# Patient Record
Sex: FEMALE | Employment: OTHER | URBAN - METROPOLITAN AREA
[De-identification: names, ages, dates, MRNs, and addresses within clinical notes are randomized per-mention and may not be internally consistent; named-entity substitution may affect disease eponyms.]

---

## 2020-01-01 ENCOUNTER — HOSPITAL ENCOUNTER (EMERGENCY)
Age: 85
Discharge: NURSING, REHAB OR LONG-TERM CARE | End: 2020-11-26
Payer: MEDICARE

## 2020-01-01 ENCOUNTER — HOSPITAL ENCOUNTER (INPATIENT)
Age: 85
LOS: 7 days | DRG: 177 | End: 2020-12-07
Attending: EMERGENCY MEDICINE | Admitting: FAMILY MEDICINE
Payer: MEDICARE

## 2020-01-01 ENCOUNTER — IP HISTORICAL/CONVERTED ENCOUNTER (OUTPATIENT)
Dept: OTHER | Age: 85
End: 2020-01-01

## 2020-01-01 ENCOUNTER — HOSPITAL ENCOUNTER (INPATIENT)
Age: 85
LOS: 4 days | Discharge: NURSING, REHAB OR LONG-TERM CARE | DRG: 689 | End: 2020-09-13
Attending: INTERNAL MEDICINE | Admitting: INTERNAL MEDICINE
Payer: MEDICARE

## 2020-01-01 ENCOUNTER — APPOINTMENT (OUTPATIENT)
Dept: GENERAL RADIOLOGY | Age: 85
DRG: 177 | End: 2020-01-01
Attending: EMERGENCY MEDICINE
Payer: MEDICARE

## 2020-01-01 ENCOUNTER — APPOINTMENT (OUTPATIENT)
Dept: GENERAL RADIOLOGY | Age: 85
DRG: 177 | End: 2020-01-01
Attending: FAMILY MEDICINE
Payer: MEDICARE

## 2020-01-01 ENCOUNTER — APPOINTMENT (OUTPATIENT)
Dept: GENERAL RADIOLOGY | Age: 85
End: 2020-01-01
Attending: PHYSICIAN ASSISTANT
Payer: MEDICARE

## 2020-01-01 ENCOUNTER — HOSPITAL ENCOUNTER (OUTPATIENT)
Dept: LAB | Age: 85
Discharge: HOME | End: 2020-10-30

## 2020-01-01 VITALS
HEIGHT: 63 IN | TEMPERATURE: 99.2 F | SYSTOLIC BLOOD PRESSURE: 114 MMHG | WEIGHT: 76 LBS | HEART RATE: 91 BPM | RESPIRATION RATE: 18 BRPM | DIASTOLIC BLOOD PRESSURE: 78 MMHG | OXYGEN SATURATION: 99 % | BODY MASS INDEX: 13.46 KG/M2

## 2020-01-01 VITALS
TEMPERATURE: 96.6 F | HEIGHT: 61 IN | HEART RATE: 69 BPM | BODY MASS INDEX: 18.88 KG/M2 | DIASTOLIC BLOOD PRESSURE: 80 MMHG | WEIGHT: 100 LBS | RESPIRATION RATE: 18 BRPM | SYSTOLIC BLOOD PRESSURE: 132 MMHG | OXYGEN SATURATION: 96 %

## 2020-01-01 VITALS
SYSTOLIC BLOOD PRESSURE: 131 MMHG | OXYGEN SATURATION: 98 % | WEIGHT: 77.16 LBS | BODY MASS INDEX: 14.57 KG/M2 | DIASTOLIC BLOOD PRESSURE: 61 MMHG | HEIGHT: 61 IN | HEART RATE: 77 BPM | RESPIRATION RATE: 18 BRPM | TEMPERATURE: 98.7 F

## 2020-01-01 DIAGNOSIS — J18.9 PNEUMONIA OF RIGHT UPPER LOBE DUE TO INFECTIOUS ORGANISM: Primary | ICD-10-CM

## 2020-01-01 DIAGNOSIS — U07.1 COVID-19: Primary | ICD-10-CM

## 2020-01-01 DIAGNOSIS — R05.9 COUGH: ICD-10-CM

## 2020-01-01 DIAGNOSIS — E87.0 HYPERNATREMIA: ICD-10-CM

## 2020-01-01 DIAGNOSIS — Z20.822 SUSPECTED COVID-19 VIRUS INFECTION: ICD-10-CM

## 2020-01-01 DIAGNOSIS — J96.01 ACUTE RESPIRATORY FAILURE WITH HYPOXIA (HCC): ICD-10-CM

## 2020-01-01 LAB
ABO + RH BLD: NORMAL
ALBUMIN SERPL-MCNC: 2 G/DL (ref 3.5–5)
ALBUMIN SERPL-MCNC: 2.3 G/DL (ref 3.5–5)
ALBUMIN SERPL-MCNC: 2.3 G/DL (ref 3.5–5)
ALBUMIN SERPL-MCNC: 2.4 G/DL (ref 3.5–5)
ALBUMIN SERPL-MCNC: 2.6 G/DL (ref 3.5–5)
ALBUMIN SERPL-MCNC: 2.7 G/DL (ref 3.5–5)
ALBUMIN SERPL-MCNC: 3 G/DL (ref 3.5–5)
ALBUMIN/GLOB SERPL: 0.5 {RATIO} (ref 1.1–2.2)
ALBUMIN/GLOB SERPL: 0.6 {RATIO} (ref 1.1–2.2)
ALBUMIN/GLOB SERPL: 0.7 {RATIO} (ref 1.1–2.2)
ALP SERPL-CCNC: 114 U/L (ref 45–117)
ALP SERPL-CCNC: 158 U/L (ref 45–117)
ALP SERPL-CCNC: 292 U/L (ref 45–117)
ALP SERPL-CCNC: 314 U/L (ref 45–117)
ALP SERPL-CCNC: 79 U/L (ref 45–117)
ALP SERPL-CCNC: 83 U/L (ref 45–117)
ALP SERPL-CCNC: 98 U/L (ref 45–117)
ALT SERPL-CCNC: 1363 U/L (ref 12–78)
ALT SERPL-CCNC: 14 U/L (ref 12–78)
ALT SERPL-CCNC: 14 U/L (ref 12–78)
ALT SERPL-CCNC: 15 U/L (ref 12–78)
ALT SERPL-CCNC: 451 U/L (ref 12–78)
ALT SERPL-CCNC: 46 U/L (ref 12–78)
ALT SERPL-CCNC: 704 U/L (ref 12–78)
ANION GAP SERPL CALC-SCNC: 3 MMOL/L (ref 5–15)
ANION GAP SERPL CALC-SCNC: 3 MMOL/L (ref 5–15)
ANION GAP SERPL CALC-SCNC: 5 MMOL/L (ref 5–15)
ANION GAP SERPL CALC-SCNC: 6 MMOL/L (ref 5–15)
ANION GAP SERPL CALC-SCNC: 6 MMOL/L (ref 5–15)
ANION GAP SERPL CALC-SCNC: 7 MMOL/L (ref 5–15)
ANION GAP SERPL CALC-SCNC: 8 MMOL/L (ref 5–15)
ANION GAP SERPL CALC-SCNC: 9 MMOL/L (ref 5–15)
APPEARANCE UR: ABNORMAL
AST SERPL W P-5'-P-CCNC: 23 U/L (ref 15–37)
AST SERPL W P-5'-P-CCNC: 26 U/L (ref 15–37)
AST SERPL W P-5'-P-CCNC: 28 U/L (ref 15–37)
AST SERPL W P-5'-P-CCNC: 411 U/L (ref 15–37)
AST SERPL W P-5'-P-CCNC: 63 U/L (ref 15–37)
AST SERPL W P-5'-P-CCNC: 770 U/L (ref 15–37)
AST SERPL W P-5'-P-CCNC: >2000 U/L (ref 15–37)
ATRIAL RATE: 116 BPM
ATRIAL RATE: 91 BPM
BACTERIA SPEC CULT: ABNORMAL
BACTERIA SPEC CULT: NORMAL
BACTERIA SPEC CULT: NORMAL
BACTERIA URNS QL MICRO: NEGATIVE /HPF
BASOPHILS # BLD: 0 K/UL (ref 0–0.1)
BASOPHILS NFR BLD: 0 % (ref 0–1)
BILIRUB SERPL-MCNC: 0.2 MG/DL (ref 0.2–1)
BILIRUB SERPL-MCNC: 0.3 MG/DL (ref 0.2–1)
BILIRUB SERPL-MCNC: 0.3 MG/DL (ref 0.2–1)
BILIRUB SERPL-MCNC: 0.4 MG/DL (ref 0.2–1)
BILIRUB SERPL-MCNC: 0.7 MG/DL (ref 0.2–1)
BILIRUB SERPL-MCNC: 2.9 MG/DL (ref 0.2–1)
BILIRUB SERPL-MCNC: 5.4 MG/DL (ref 0.2–1)
BILIRUB UR QL: NEGATIVE
BLD PROD TYP BPU: NORMAL
BLOOD GROUP ANTIBODIES SERPL: NEGATIVE
BNP SERPL-MCNC: 734 PG/ML
BPU ID: NORMAL
BUN SERPL-MCNC: 15 MG/DL (ref 6–20)
BUN SERPL-MCNC: 19 MG/DL (ref 6–20)
BUN SERPL-MCNC: 29 MG/DL (ref 6–20)
BUN SERPL-MCNC: 31 MG/DL (ref 6–20)
BUN SERPL-MCNC: 33 MG/DL (ref 6–20)
BUN SERPL-MCNC: 34 MG/DL (ref 6–20)
BUN SERPL-MCNC: 41 MG/DL (ref 6–20)
BUN SERPL-MCNC: 50 MG/DL (ref 6–20)
BUN/CREAT SERPL: 25 (ref 12–20)
BUN/CREAT SERPL: 30 (ref 12–20)
BUN/CREAT SERPL: 38 (ref 12–20)
BUN/CREAT SERPL: 40 (ref 12–20)
BUN/CREAT SERPL: 44 (ref 12–20)
BUN/CREAT SERPL: 45 (ref 12–20)
BUN/CREAT SERPL: 46 (ref 12–20)
BUN/CREAT SERPL: 46 (ref 12–20)
CA-I BLD-MCNC: 7.5 MG/DL (ref 8.5–10.1)
CA-I BLD-MCNC: 7.8 MG/DL (ref 8.5–10.1)
CA-I BLD-MCNC: 8.1 MG/DL (ref 8.5–10.1)
CA-I BLD-MCNC: 8.4 MG/DL (ref 8.5–10.1)
CA-I BLD-MCNC: 8.7 MG/DL (ref 8.5–10.1)
CA-I BLD-MCNC: 8.9 MG/DL (ref 8.5–10.1)
CA-I BLD-MCNC: 9.2 MG/DL (ref 8.5–10.1)
CA-I BLD-MCNC: 9.7 MG/DL (ref 8.5–10.1)
CALCULATED P AXIS, ECG09: 56 DEGREES
CALCULATED P AXIS, ECG09: 72 DEGREES
CALCULATED R AXIS, ECG10: 10 DEGREES
CALCULATED R AXIS, ECG10: 10 DEGREES
CALCULATED T AXIS, ECG11: 72 DEGREES
CALCULATED T AXIS, ECG11: 91 DEGREES
CC UR VC: ABNORMAL
CHLORIDE SERPL-SCNC: 100 MMOL/L (ref 97–108)
CHLORIDE SERPL-SCNC: 106 MMOL/L (ref 97–108)
CHLORIDE SERPL-SCNC: 110 MMOL/L (ref 97–108)
CHLORIDE SERPL-SCNC: 115 MMOL/L (ref 97–108)
CHLORIDE SERPL-SCNC: 122 MMOL/L (ref 97–108)
CHLORIDE SERPL-SCNC: 125 MMOL/L (ref 97–108)
CHLORIDE SERPL-SCNC: 131 MMOL/L (ref 97–108)
CHLORIDE SERPL-SCNC: 96 MMOL/L (ref 97–108)
CO2 SERPL-SCNC: 25 MMOL/L (ref 21–32)
CO2 SERPL-SCNC: 28 MMOL/L (ref 21–32)
CO2 SERPL-SCNC: 29 MMOL/L (ref 21–32)
CO2 SERPL-SCNC: 30 MMOL/L (ref 21–32)
CO2 SERPL-SCNC: 30 MMOL/L (ref 21–32)
CO2 SERPL-SCNC: 33 MMOL/L (ref 21–32)
COLOR UR: ABNORMAL
CREAT SERPL-MCNC: 0.6 MG/DL (ref 0.55–1.02)
CREAT SERPL-MCNC: 0.64 MG/DL (ref 0.55–1.02)
CREAT SERPL-MCNC: 0.66 MG/DL (ref 0.55–1.02)
CREAT SERPL-MCNC: 0.67 MG/DL (ref 0.55–1.02)
CREAT SERPL-MCNC: 0.74 MG/DL (ref 0.55–1.02)
CREAT SERPL-MCNC: 0.84 MG/DL (ref 0.55–1.02)
CREAT SERPL-MCNC: 0.89 MG/DL (ref 0.55–1.02)
CREAT SERPL-MCNC: 1.3 MG/DL (ref 0.55–1.02)
CRP SERPL-MCNC: 0.86 MG/DL (ref 0–0.6)
CRP SERPL-MCNC: 0.98 MG/DL (ref 0–0.6)
CRP SERPL-MCNC: 1.68 MG/DL (ref 0–0.6)
CRP SERPL-MCNC: 1.76 MG/DL (ref 0–0.6)
CRP SERPL-MCNC: 3.68 MG/DL (ref 0–0.6)
DIAGNOSIS, 93000: NORMAL
DIAGNOSIS, 93000: NORMAL
DIFFERENTIAL METHOD BLD: ABNORMAL
EOSINOPHIL # BLD: 0 K/UL (ref 0–0.4)
EOSINOPHIL NFR BLD: 0 % (ref 0–7)
ERYTHROCYTE [DISTWIDTH] IN BLOOD BY AUTOMATED COUNT: 13.8 % (ref 11.5–14.5)
ERYTHROCYTE [DISTWIDTH] IN BLOOD BY AUTOMATED COUNT: 14 % (ref 11.5–14.5)
ERYTHROCYTE [DISTWIDTH] IN BLOOD BY AUTOMATED COUNT: 14 % (ref 11.5–14.5)
ERYTHROCYTE [DISTWIDTH] IN BLOOD BY AUTOMATED COUNT: 14.3 % (ref 11.5–14.5)
ERYTHROCYTE [DISTWIDTH] IN BLOOD BY AUTOMATED COUNT: 14.3 % (ref 11.5–14.5)
ERYTHROCYTE [DISTWIDTH] IN BLOOD BY AUTOMATED COUNT: 14.6 % (ref 11.5–14.5)
ERYTHROCYTE [DISTWIDTH] IN BLOOD BY AUTOMATED COUNT: 14.7 % (ref 11.5–14.5)
ERYTHROCYTE [DISTWIDTH] IN BLOOD BY AUTOMATED COUNT: 14.8 % (ref 11.5–14.5)
FERRITIN SERPL-MCNC: 2619 NG/ML (ref 26–388)
FERRITIN SERPL-MCNC: 405 NG/ML (ref 8–252)
FERRITIN SERPL-MCNC: 429 NG/ML (ref 8–252)
FERRITIN SERPL-MCNC: 8574 NG/ML (ref 8–252)
FLUAV AG NPH QL IA: NEGATIVE
FLUBV AG NOSE QL IA: NEGATIVE
GLOBULIN SER CALC-MCNC: 3.2 G/DL (ref 2–4)
GLOBULIN SER CALC-MCNC: 3.6 G/DL (ref 2–4)
GLOBULIN SER CALC-MCNC: 3.9 G/DL (ref 2–4)
GLOBULIN SER CALC-MCNC: 4 G/DL (ref 2–4)
GLOBULIN SER CALC-MCNC: 4.3 G/DL (ref 2–4)
GLOBULIN SER CALC-MCNC: 5.4 G/DL (ref 2–4)
GLOBULIN SER CALC-MCNC: 5.7 G/DL (ref 2–4)
GLUCOSE SERPL-MCNC: 104 MG/DL (ref 65–100)
GLUCOSE SERPL-MCNC: 107 MG/DL (ref 65–100)
GLUCOSE SERPL-MCNC: 110 MG/DL (ref 65–100)
GLUCOSE SERPL-MCNC: 114 MG/DL (ref 65–100)
GLUCOSE SERPL-MCNC: 139 MG/DL (ref 65–100)
GLUCOSE SERPL-MCNC: 170 MG/DL (ref 65–100)
GLUCOSE SERPL-MCNC: 179 MG/DL (ref 65–100)
GLUCOSE SERPL-MCNC: 186 MG/DL (ref 65–100)
GLUCOSE UR STRIP.AUTO-MCNC: NEGATIVE MG/DL
HCT VFR BLD AUTO: 36.2 % (ref 35–47)
HCT VFR BLD AUTO: 37.8 % (ref 35–47)
HCT VFR BLD AUTO: 41.8 % (ref 35–47)
HCT VFR BLD AUTO: 42.1 % (ref 35–47)
HCT VFR BLD AUTO: 43.9 % (ref 35–47)
HCT VFR BLD AUTO: 44 % (ref 35–47)
HCT VFR BLD AUTO: 44.1 % (ref 35–47)
HCT VFR BLD AUTO: 45.7 % (ref 35–47)
HGB BLD-MCNC: 11.4 G/DL (ref 11.5–16)
HGB BLD-MCNC: 12.7 G/DL (ref 11.5–16)
HGB BLD-MCNC: 13 G/DL (ref 11.5–16)
HGB BLD-MCNC: 13.5 G/DL (ref 11.5–16)
HGB BLD-MCNC: 14 G/DL (ref 11.5–16)
HGB BLD-MCNC: 14 G/DL (ref 11.5–16)
HGB BLD-MCNC: 14.5 G/DL (ref 11.5–16)
HGB BLD-MCNC: 15 G/DL (ref 11.5–16)
HGB UR QL STRIP: NEGATIVE
HYALINE CASTS URNS QL MICRO: ABNORMAL /LPF (ref 0–5)
IMM GRANULOCYTES # BLD AUTO: 0 K/UL
IMM GRANULOCYTES # BLD AUTO: 0 K/UL (ref 0–0.04)
IMM GRANULOCYTES # BLD AUTO: 0.1 K/UL (ref 0–0.04)
IMM GRANULOCYTES # BLD AUTO: 0.2 K/UL (ref 0–0.04)
IMM GRANULOCYTES # BLD AUTO: 0.3 K/UL (ref 0–0.04)
IMM GRANULOCYTES # BLD AUTO: 0.5 K/UL (ref 0–0.04)
IMM GRANULOCYTES NFR BLD AUTO: 0 %
IMM GRANULOCYTES NFR BLD AUTO: 0 % (ref 0–0.5)
IMM GRANULOCYTES NFR BLD AUTO: 1 % (ref 0–0.5)
IMM GRANULOCYTES NFR BLD AUTO: 2 % (ref 0–0.5)
IMM GRANULOCYTES NFR BLD AUTO: 4 % (ref 0–0.5)
KETONES UR QL STRIP.AUTO: 20 MG/DL
LACTATE SERPL-SCNC: 1.3 MMOL/L (ref 0.4–2)
LACTATE SERPL-SCNC: 2.5 MMOL/L (ref 0.4–2)
LACTATE SERPL-SCNC: 2.5 MMOL/L (ref 0.4–2)
LDH SERPL L TO P-CCNC: 1044 U/L (ref 81–246)
LDH SERPL L TO P-CCNC: 1764 U/L (ref 81–246)
LDH SERPL L TO P-CCNC: 250 U/L (ref 81–246)
LDH SERPL L TO P-CCNC: 318 U/L (ref 81–246)
LDH SERPL L TO P-CCNC: 656 U/L (ref 81–246)
LEUKOCYTE ESTERASE UR QL STRIP.AUTO: ABNORMAL
LYMPHOCYTES # BLD: 0.7 K/UL (ref 0.8–3.5)
LYMPHOCYTES # BLD: 0.8 K/UL (ref 0.8–3.5)
LYMPHOCYTES # BLD: 0.8 K/UL (ref 0.8–3.5)
LYMPHOCYTES # BLD: 0.9 K/UL (ref 0.8–3.5)
LYMPHOCYTES # BLD: 0.9 K/UL (ref 0.8–3.5)
LYMPHOCYTES # BLD: 1 K/UL (ref 0.8–3.5)
LYMPHOCYTES # BLD: 1.3 K/UL (ref 0.8–3.5)
LYMPHOCYTES # BLD: 1.3 K/UL (ref 0.8–3.5)
LYMPHOCYTES NFR BLD: 10 % (ref 12–49)
LYMPHOCYTES NFR BLD: 11 % (ref 12–49)
LYMPHOCYTES NFR BLD: 4 % (ref 12–49)
LYMPHOCYTES NFR BLD: 6 % (ref 12–49)
LYMPHOCYTES NFR BLD: 6 % (ref 12–49)
LYMPHOCYTES NFR BLD: 7 % (ref 12–49)
LYMPHOCYTES NFR BLD: 8 % (ref 12–49)
LYMPHOCYTES NFR BLD: 9 % (ref 12–49)
MCH RBC QN AUTO: 29.7 PG (ref 26–34)
MCH RBC QN AUTO: 29.9 PG (ref 26–34)
MCH RBC QN AUTO: 30 PG (ref 26–34)
MCH RBC QN AUTO: 30 PG (ref 26–34)
MCH RBC QN AUTO: 30.1 PG (ref 26–34)
MCH RBC QN AUTO: 30.2 PG (ref 26–34)
MCH RBC QN AUTO: 30.7 PG (ref 26–34)
MCH RBC QN AUTO: 30.8 PG (ref 26–34)
MCHC RBC AUTO-ENTMCNC: 30.6 G/DL (ref 30–36.5)
MCHC RBC AUTO-ENTMCNC: 30.9 G/DL (ref 30–36.5)
MCHC RBC AUTO-ENTMCNC: 31.5 G/DL (ref 30–36.5)
MCHC RBC AUTO-ENTMCNC: 31.9 G/DL (ref 30–36.5)
MCHC RBC AUTO-ENTMCNC: 32.3 G/DL (ref 30–36.5)
MCHC RBC AUTO-ENTMCNC: 32.9 G/DL (ref 30–36.5)
MCHC RBC AUTO-ENTMCNC: 33.6 G/DL (ref 30–36.5)
MCHC RBC AUTO-ENTMCNC: 34.1 G/DL (ref 30–36.5)
MCV RBC AUTO: 88.7 FL (ref 80–99)
MCV RBC AUTO: 91.3 FL (ref 80–99)
MCV RBC AUTO: 91.3 FL (ref 80–99)
MCV RBC AUTO: 91.9 FL (ref 80–99)
MCV RBC AUTO: 95.3 FL (ref 80–99)
MCV RBC AUTO: 96.7 FL (ref 80–99)
MCV RBC AUTO: 97.4 FL (ref 80–99)
MCV RBC AUTO: 97.5 FL (ref 80–99)
MONOCYTES # BLD: 0.1 K/UL (ref 0–1)
MONOCYTES # BLD: 0.3 K/UL (ref 0–1)
MONOCYTES # BLD: 0.5 K/UL (ref 0–1)
MONOCYTES # BLD: 0.6 K/UL (ref 0–1)
MONOCYTES # BLD: 0.9 K/UL (ref 0–1)
MONOCYTES # BLD: 1.6 K/UL (ref 0–1)
MONOCYTES NFR BLD: 1 % (ref 5–13)
MONOCYTES NFR BLD: 4 % (ref 5–13)
MONOCYTES NFR BLD: 5 % (ref 5–13)
MONOCYTES NFR BLD: 5 % (ref 5–13)
MONOCYTES NFR BLD: 8 % (ref 5–13)
MONOCYTES NFR BLD: 8 % (ref 5–13)
NEUTS SEG # BLD: 10.1 K/UL (ref 1.8–8)
NEUTS SEG # BLD: 10.8 K/UL (ref 1.8–8)
NEUTS SEG # BLD: 11.6 K/UL (ref 1.8–8)
NEUTS SEG # BLD: 11.7 K/UL (ref 1.8–8)
NEUTS SEG # BLD: 12.4 K/UL (ref 1.8–8)
NEUTS SEG # BLD: 29.3 K/UL (ref 1.8–8)
NEUTS SEG # BLD: 6.4 K/UL (ref 1.8–8)
NEUTS SEG # BLD: 6.8 K/UL (ref 1.8–8)
NEUTS SEG NFR BLD: 82 % (ref 32–75)
NEUTS SEG NFR BLD: 83 % (ref 32–75)
NEUTS SEG NFR BLD: 83 % (ref 32–75)
NEUTS SEG NFR BLD: 85 % (ref 32–75)
NEUTS SEG NFR BLD: 88 % (ref 32–75)
NEUTS SEG NFR BLD: 88 % (ref 32–75)
NEUTS SEG NFR BLD: 91 % (ref 32–75)
NEUTS SEG NFR BLD: 91 % (ref 32–75)
NITRITE UR QL STRIP.AUTO: NEGATIVE
NRBC # BLD: 0 K/UL (ref 0–0.01)
NRBC # BLD: 0 K/UL (ref 0–0.01)
NRBC # BLD: 0.03 K/UL (ref 0–0.01)
NRBC BLD-RTO: 0 PER 100 WBC
NRBC BLD-RTO: 0 PER 100 WBC
NRBC BLD-RTO: 0.2 PER 100 WBC
P-R INTERVAL, ECG05: 116 MS
P-R INTERVAL, ECG05: 132 MS
PH UR STRIP: 5 [PH] (ref 5–8)
PLATELET # BLD AUTO: 263 K/UL (ref 150–400)
PLATELET # BLD AUTO: 290 K/UL (ref 150–400)
PLATELET # BLD AUTO: 293 K/UL (ref 150–400)
PLATELET # BLD AUTO: 315 K/UL (ref 150–400)
PLATELET # BLD AUTO: 325 K/UL (ref 150–400)
PLATELET # BLD AUTO: 340 K/UL (ref 150–400)
PLATELET # BLD AUTO: 379 K/UL (ref 150–400)
PLATELET # BLD AUTO: 400 K/UL (ref 150–400)
PMV BLD AUTO: 10.7 FL (ref 8.9–12.9)
PMV BLD AUTO: 10.7 FL (ref 8.9–12.9)
PMV BLD AUTO: 10.8 FL (ref 8.9–12.9)
PMV BLD AUTO: 11.3 FL (ref 8.9–12.9)
PMV BLD AUTO: 11.6 FL (ref 8.9–12.9)
PMV BLD AUTO: 12.3 FL (ref 8.9–12.9)
PMV BLD AUTO: 12.5 FL (ref 8.9–12.9)
PMV BLD AUTO: 9.8 FL (ref 8.9–12.9)
POTASSIUM SERPL-SCNC: 2.9 MMOL/L (ref 3.5–5.1)
POTASSIUM SERPL-SCNC: 3.5 MMOL/L (ref 3.5–5.1)
POTASSIUM SERPL-SCNC: 3.7 MMOL/L (ref 3.5–5.1)
POTASSIUM SERPL-SCNC: 3.8 MMOL/L (ref 3.5–5.1)
POTASSIUM SERPL-SCNC: 4 MMOL/L (ref 3.5–5.1)
POTASSIUM SERPL-SCNC: 4.1 MMOL/L (ref 3.5–5.1)
POTASSIUM SERPL-SCNC: 4.1 MMOL/L (ref 3.5–5.1)
POTASSIUM SERPL-SCNC: 4.5 MMOL/L (ref 3.5–5.1)
PROCALCITONIN SERPL-MCNC: <0.05 NG/ML
PROT SERPL-MCNC: 5.2 G/DL (ref 6.4–8.2)
PROT SERPL-MCNC: 5.9 G/DL (ref 6.4–8.2)
PROT SERPL-MCNC: 6.3 G/DL (ref 6.4–8.2)
PROT SERPL-MCNC: 6.6 G/DL (ref 6.4–8.2)
PROT SERPL-MCNC: 6.7 G/DL (ref 6.4–8.2)
PROT SERPL-MCNC: 8 G/DL (ref 6.4–8.2)
PROT SERPL-MCNC: 8.7 G/DL (ref 6.4–8.2)
PROT UR STRIP-MCNC: 100 MG/DL
Q-T INTERVAL, ECG07: 346 MS
Q-T INTERVAL, ECG07: 352 MS
QRS DURATION, ECG06: 88 MS
QRS DURATION, ECG06: 96 MS
QTC CALCULATION (BEZET), ECG08: 432 MS
QTC CALCULATION (BEZET), ECG08: 480 MS
RBC # BLD AUTO: 3.8 M/UL (ref 3.8–5.2)
RBC # BLD AUTO: 4.14 M/UL (ref 3.8–5.2)
RBC # BLD AUTO: 4.32 M/UL (ref 3.8–5.2)
RBC # BLD AUTO: 4.54 M/UL (ref 3.8–5.2)
RBC # BLD AUTO: 4.55 M/UL (ref 3.8–5.2)
RBC # BLD AUTO: 4.69 M/UL (ref 3.8–5.2)
RBC # BLD AUTO: 4.83 M/UL (ref 3.8–5.2)
RBC # BLD AUTO: 4.96 M/UL (ref 3.8–5.2)
RBC #/AREA URNS HPF: ABNORMAL /HPF (ref 0–5)
RBC MORPH BLD: ABNORMAL
RBC MORPH BLD: ABNORMAL
SARS-COV-2, NAA: DETECTED
SERVICE CMNT-IMP: ABNORMAL
SODIUM SERPL-SCNC: 130 MMOL/L (ref 136–145)
SODIUM SERPL-SCNC: 139 MMOL/L (ref 136–145)
SODIUM SERPL-SCNC: 141 MMOL/L (ref 136–145)
SODIUM SERPL-SCNC: 147 MMOL/L (ref 136–145)
SODIUM SERPL-SCNC: 148 MMOL/L (ref 136–145)
SODIUM SERPL-SCNC: 157 MMOL/L (ref 136–145)
SODIUM SERPL-SCNC: 159 MMOL/L (ref 136–145)
SODIUM SERPL-SCNC: 164 MMOL/L (ref 136–145)
SP GR UR REFRACTOMETRY: 1.01 (ref 1–1.03)
SPECIAL REQUESTS,SREQ: NORMAL
SPECIAL REQUESTS,SREQ: NORMAL
SPECIMEN EXP DATE BLD: NORMAL
STATUS OF UNIT,%ST: NORMAL
TRANSFUSION STATUS PATIENT QL: NORMAL
TROPONIN I SERPL-MCNC: <0.05 NG/ML
UA: UC IF INDICATED,UAUC: ABNORMAL
UNIT DIVISION, %UDIV: 0
UROBILINOGEN UR QL STRIP.AUTO: 0.1 EU/DL (ref 0.1–1)
VENTRICULAR RATE, ECG03: 116 BPM
VENTRICULAR RATE, ECG03: 91 BPM
WBC # BLD AUTO: 11.9 K/UL (ref 3.6–11)
WBC # BLD AUTO: 12 K/UL (ref 3.6–11)
WBC # BLD AUTO: 12.5 K/UL (ref 3.6–11)
WBC # BLD AUTO: 13.3 K/UL (ref 3.6–11)
WBC # BLD AUTO: 14.4 K/UL (ref 3.6–11)
WBC # BLD AUTO: 32.2 K/UL (ref 3.6–11)
WBC # BLD AUTO: 7.9 K/UL (ref 3.6–11)
WBC # BLD AUTO: 8 K/UL (ref 3.6–11)
WBC URNS QL MICRO: >100 /HPF (ref 0–4)

## 2020-01-01 PROCEDURE — 74011250637 HC RX REV CODE- 250/637: Performed by: INTERNAL MEDICINE

## 2020-01-01 PROCEDURE — 84484 ASSAY OF TROPONIN QUANT: CPT

## 2020-01-01 PROCEDURE — 81001 URINALYSIS AUTO W/SCOPE: CPT

## 2020-01-01 PROCEDURE — 99223 1ST HOSP IP/OBS HIGH 75: CPT | Performed by: INTERNAL MEDICINE

## 2020-01-01 PROCEDURE — 74011000258 HC RX REV CODE- 258: Performed by: INTERNAL MEDICINE

## 2020-01-01 PROCEDURE — 82728 ASSAY OF FERRITIN: CPT

## 2020-01-01 PROCEDURE — 36415 COLL VENOUS BLD VENIPUNCTURE: CPT

## 2020-01-01 PROCEDURE — 74011000250 HC RX REV CODE- 250: Performed by: FAMILY MEDICINE

## 2020-01-01 PROCEDURE — 83615 LACTATE (LD) (LDH) ENZYME: CPT

## 2020-01-01 PROCEDURE — 74011000258 HC RX REV CODE- 258: Performed by: EMERGENCY MEDICINE

## 2020-01-01 PROCEDURE — 93005 ELECTROCARDIOGRAM TRACING: CPT

## 2020-01-01 PROCEDURE — 36430 TRANSFUSION BLD/BLD COMPNT: CPT

## 2020-01-01 PROCEDURE — 74011250636 HC RX REV CODE- 250/636: Performed by: INTERNAL MEDICINE

## 2020-01-01 PROCEDURE — 74011000258 HC RX REV CODE- 258: Performed by: FAMILY MEDICINE

## 2020-01-01 PROCEDURE — 74011250636 HC RX REV CODE- 250/636

## 2020-01-01 PROCEDURE — 94762 N-INVAS EAR/PLS OXIMTRY CONT: CPT

## 2020-01-01 PROCEDURE — 87086 URINE CULTURE/COLONY COUNT: CPT

## 2020-01-01 PROCEDURE — 85025 COMPLETE CBC W/AUTO DIFF WBC: CPT

## 2020-01-01 PROCEDURE — XW033H5 INTRODUCTION OF TOCILIZUMAB INTO PERIPHERAL VEIN, PERCUTANEOUS APPROACH, NEW TECHNOLOGY GROUP 5: ICD-10-PCS | Performed by: INTERNAL MEDICINE

## 2020-01-01 PROCEDURE — 86140 C-REACTIVE PROTEIN: CPT

## 2020-01-01 PROCEDURE — 74011250637 HC RX REV CODE- 250/637: Performed by: FAMILY MEDICINE

## 2020-01-01 PROCEDURE — 74011000250 HC RX REV CODE- 250: Performed by: INTERNAL MEDICINE

## 2020-01-01 PROCEDURE — 99232 SBSQ HOSP IP/OBS MODERATE 35: CPT | Performed by: INTERNAL MEDICINE

## 2020-01-01 PROCEDURE — 86900 BLOOD TYPING SEROLOGIC ABO: CPT

## 2020-01-01 PROCEDURE — 77010033678 HC OXYGEN DAILY

## 2020-01-01 PROCEDURE — 80053 COMPREHEN METABOLIC PANEL: CPT

## 2020-01-01 PROCEDURE — 97161 PT EVAL LOW COMPLEX 20 MIN: CPT

## 2020-01-01 PROCEDURE — 83880 ASSAY OF NATRIURETIC PEPTIDE: CPT

## 2020-01-01 PROCEDURE — 65270000029 HC RM PRIVATE

## 2020-01-01 PROCEDURE — 96366 THER/PROPH/DIAG IV INF ADDON: CPT

## 2020-01-01 PROCEDURE — 83605 ASSAY OF LACTIC ACID: CPT

## 2020-01-01 PROCEDURE — 80048 BASIC METABOLIC PNL TOTAL CA: CPT

## 2020-01-01 PROCEDURE — 74011250636 HC RX REV CODE- 250/636: Performed by: EMERGENCY MEDICINE

## 2020-01-01 PROCEDURE — 74011250636 HC RX REV CODE- 250/636: Performed by: FAMILY MEDICINE

## 2020-01-01 PROCEDURE — 97530 THERAPEUTIC ACTIVITIES: CPT

## 2020-01-01 PROCEDURE — 87804 INFLUENZA ASSAY W/OPTIC: CPT

## 2020-01-01 PROCEDURE — 97165 OT EVAL LOW COMPLEX 30 MIN: CPT

## 2020-01-01 PROCEDURE — 99284 EMERGENCY DEPT VISIT MOD MDM: CPT

## 2020-01-01 PROCEDURE — 87040 BLOOD CULTURE FOR BACTERIA: CPT

## 2020-01-01 PROCEDURE — 71045 X-RAY EXAM CHEST 1 VIEW: CPT

## 2020-01-01 PROCEDURE — 99285 EMERGENCY DEPT VISIT HI MDM: CPT

## 2020-01-01 PROCEDURE — 99999999999 HC CONV PATIENT CONVENIENCE-OTHER

## 2020-01-01 PROCEDURE — XW033E5 INTRODUCTION OF REMDESIVIR ANTI-INFECTIVE INTO PERIPHERAL VEIN, PERCUTANEOUS APPROACH, NEW TECHNOLOGY GROUP 5: ICD-10-PCS | Performed by: INTERNAL MEDICINE

## 2020-01-01 PROCEDURE — 96374 THER/PROPH/DIAG INJ IV PUSH: CPT

## 2020-01-01 PROCEDURE — XW13325 TRANSFUSION OF CONVALESCENT PLASMA (NONAUTOLOGOUS) INTO PERIPHERAL VEIN, PERCUTANEOUS APPROACH, NEW TECHNOLOGY GROUP 5: ICD-10-PCS | Performed by: FAMILY MEDICINE

## 2020-01-01 PROCEDURE — 74011000258 HC RX REV CODE- 258: Performed by: PHYSICIAN ASSISTANT

## 2020-01-01 PROCEDURE — 96365 THER/PROPH/DIAG IV INF INIT: CPT

## 2020-01-01 PROCEDURE — 84145 PROCALCITONIN (PCT): CPT

## 2020-01-01 PROCEDURE — 74011250636 HC RX REV CODE- 250/636: Performed by: PHYSICIAN ASSISTANT

## 2020-01-01 PROCEDURE — 70450 CT HEAD/BRAIN W/O DYE: CPT

## 2020-01-01 PROCEDURE — 99218 HC RM OBSERVATION: CPT

## 2020-01-01 PROCEDURE — 94760 N-INVAS EAR/PLS OXIMETRY 1: CPT

## 2020-01-01 RX ORDER — DORZOLAMIDE HCL 20 MG/ML
1 SOLUTION/ DROPS OPHTHALMIC 2 TIMES DAILY
Status: DISCONTINUED | OUTPATIENT
Start: 2020-01-01 | End: 2020-01-01 | Stop reason: HOSPADM

## 2020-01-01 RX ORDER — TIMOLOL MALEATE 5 MG/ML
1 SOLUTION/ DROPS OPHTHALMIC 2 TIMES DAILY
Status: DISCONTINUED | OUTPATIENT
Start: 2020-01-01 | End: 2020-01-01 | Stop reason: HOSPADM

## 2020-01-01 RX ORDER — POTASSIUM CHLORIDE 7.45 MG/ML
10 INJECTION INTRAVENOUS
Status: COMPLETED | OUTPATIENT
Start: 2020-01-01 | End: 2020-01-01

## 2020-01-01 RX ORDER — SODIUM CHLORIDE 9 MG/ML
250 INJECTION, SOLUTION INTRAVENOUS AS NEEDED
Status: DISCONTINUED | OUTPATIENT
Start: 2020-01-01 | End: 2020-01-01

## 2020-01-01 RX ORDER — HEPARIN SODIUM 10000 [USP'U]/ML
5000 INJECTION, SOLUTION INTRAVENOUS; SUBCUTANEOUS EVERY 8 HOURS
Status: DISCONTINUED | OUTPATIENT
Start: 2020-01-01 | End: 2020-01-01 | Stop reason: HOSPADM

## 2020-01-01 RX ORDER — AZITHROMYCIN 250 MG/1
250 TABLET, FILM COATED ORAL SEE ADMIN INSTRUCTIONS
Qty: 6 TAB | Refills: 0 | Status: SHIPPED | OUTPATIENT
Start: 2020-01-01 | End: 2020-01-01

## 2020-01-01 RX ORDER — AMLODIPINE BESYLATE 5 MG/1
5 TABLET ORAL DAILY
Status: DISCONTINUED | OUTPATIENT
Start: 2020-01-01 | End: 2020-01-01 | Stop reason: HOSPADM

## 2020-01-01 RX ORDER — HYDRALAZINE HYDROCHLORIDE 20 MG/ML
5 INJECTION INTRAMUSCULAR; INTRAVENOUS
Status: DISCONTINUED | OUTPATIENT
Start: 2020-01-01 | End: 2020-01-01 | Stop reason: HOSPADM

## 2020-01-01 RX ORDER — LATANOPROST 50 UG/ML
1 SOLUTION/ DROPS OPHTHALMIC
Status: DISCONTINUED | OUTPATIENT
Start: 2020-01-01 | End: 2020-01-01 | Stop reason: HOSPADM

## 2020-01-01 RX ORDER — POTASSIUM CHLORIDE 750 MG/1
40 TABLET, FILM COATED, EXTENDED RELEASE ORAL
Status: COMPLETED | OUTPATIENT
Start: 2020-01-01 | End: 2020-01-01

## 2020-01-01 RX ORDER — HEPARIN SODIUM 5000 [USP'U]/.5ML
5000 INJECTION, SOLUTION INTRAVENOUS; SUBCUTANEOUS EVERY 8 HOURS
Status: DISCONTINUED | OUTPATIENT
Start: 2020-01-01 | End: 2020-01-01

## 2020-01-01 RX ORDER — HEPARIN SODIUM 5000 [USP'U]/ML
5000 INJECTION, SOLUTION INTRAVENOUS; SUBCUTANEOUS EVERY 8 HOURS
Status: DISCONTINUED | OUTPATIENT
Start: 2020-01-01 | End: 2020-01-01

## 2020-01-01 RX ORDER — ONDANSETRON 4 MG/1
4 TABLET, ORALLY DISINTEGRATING ORAL
Status: DISCONTINUED | OUTPATIENT
Start: 2020-01-01 | End: 2020-01-01 | Stop reason: HOSPADM

## 2020-01-01 RX ORDER — ACETAMINOPHEN 325 MG/1
650 TABLET ORAL
Status: DISCONTINUED | OUTPATIENT
Start: 2020-01-01 | End: 2020-01-01 | Stop reason: HOSPADM

## 2020-01-01 RX ORDER — CEFTRIAXONE 1 G/1
INJECTION, POWDER, FOR SOLUTION INTRAMUSCULAR; INTRAVENOUS
Status: DISPENSED
Start: 2020-01-01 | End: 2020-01-01

## 2020-01-01 RX ORDER — SODIUM CHLORIDE 9 MG/ML
75 INJECTION, SOLUTION INTRAVENOUS CONTINUOUS
Status: DISCONTINUED | OUTPATIENT
Start: 2020-01-01 | End: 2020-01-01 | Stop reason: HOSPADM

## 2020-01-01 RX ORDER — DEXAMETHASONE SODIUM PHOSPHATE 4 MG/ML
4 INJECTION, SOLUTION INTRA-ARTICULAR; INTRALESIONAL; INTRAMUSCULAR; INTRAVENOUS; SOFT TISSUE EVERY 6 HOURS
Status: DISCONTINUED | OUTPATIENT
Start: 2020-01-01 | End: 2020-01-01 | Stop reason: HOSPADM

## 2020-01-01 RX ORDER — ONDANSETRON 2 MG/ML
4 INJECTION INTRAMUSCULAR; INTRAVENOUS
Status: DISCONTINUED | OUTPATIENT
Start: 2020-01-01 | End: 2020-01-01 | Stop reason: HOSPADM

## 2020-01-01 RX ORDER — ACETAMINOPHEN 650 MG/1
650 SUPPOSITORY RECTAL
Status: DISCONTINUED | OUTPATIENT
Start: 2020-01-01 | End: 2020-01-01 | Stop reason: HOSPADM

## 2020-01-01 RX ORDER — POLYETHYLENE GLYCOL 3350 17 G/17G
17 POWDER, FOR SOLUTION ORAL DAILY PRN
Status: DISCONTINUED | OUTPATIENT
Start: 2020-01-01 | End: 2020-01-01 | Stop reason: HOSPADM

## 2020-01-01 RX ORDER — HEPARIN SODIUM 5000 [USP'U]/.5ML
5000 INJECTION, SOLUTION INTRAVENOUS; SUBCUTANEOUS EVERY 8 HOURS
Status: DISCONTINUED | OUTPATIENT
Start: 2020-01-01 | End: 2020-01-01 | Stop reason: CLARIF

## 2020-01-01 RX ORDER — HYDROXYZINE PAMOATE 25 MG/1
25 CAPSULE ORAL
Status: DISCONTINUED | OUTPATIENT
Start: 2020-01-01 | End: 2020-01-01 | Stop reason: HOSPADM

## 2020-01-01 RX ORDER — ACETAMINOPHEN 325 MG/1
650 TABLET ORAL
Qty: 30 TAB | Refills: 0 | Status: SHIPPED | OUTPATIENT
Start: 2020-01-01 | End: 2020-01-01

## 2020-01-01 RX ORDER — DEXTROSE MONOHYDRATE 50 MG/ML
85 INJECTION, SOLUTION INTRAVENOUS CONTINUOUS
Status: DISCONTINUED | OUTPATIENT
Start: 2020-01-01 | End: 2020-01-01

## 2020-01-01 RX ORDER — LEVOFLOXACIN 750 MG/1
750 TABLET ORAL DAILY
Qty: 5 TAB | Refills: 0 | Status: SHIPPED | OUTPATIENT
Start: 2020-01-01

## 2020-01-01 RX ORDER — HEPARIN SODIUM 10000 [USP'U]/ML
INJECTION, SOLUTION INTRAVENOUS; SUBCUTANEOUS
Status: COMPLETED
Start: 2020-01-01 | End: 2020-01-01

## 2020-01-01 RX ORDER — LATANOPROST 50 UG/ML
1 SOLUTION/ DROPS OPHTHALMIC
COMMUNITY

## 2020-01-01 RX ORDER — DEXTROSE MONOHYDRATE 50 MG/ML
50 INJECTION, SOLUTION INTRAVENOUS CONTINUOUS
Status: DISPENSED | OUTPATIENT
Start: 2020-01-01 | End: 2020-01-01

## 2020-01-01 RX ORDER — MAG HYDROX/ALUMINUM HYD/SIMETH 200-200-20
30 SUSPENSION, ORAL (FINAL DOSE FORM) ORAL
Status: DISCONTINUED | OUTPATIENT
Start: 2020-01-01 | End: 2020-01-01 | Stop reason: HOSPADM

## 2020-01-01 RX ORDER — POTASSIUM CHLORIDE 7.45 MG/ML
10 INJECTION INTRAVENOUS
Status: DISPENSED | OUTPATIENT
Start: 2020-01-01 | End: 2020-01-01

## 2020-01-01 RX ORDER — SODIUM CHLORIDE 9 MG/ML
75 INJECTION, SOLUTION INTRAVENOUS CONTINUOUS
Status: DISCONTINUED | OUTPATIENT
Start: 2020-01-01 | End: 2020-01-01

## 2020-01-01 RX ORDER — AMLODIPINE BESYLATE 5 MG/1
5 TABLET ORAL DAILY
Qty: 30 TAB | Refills: 0 | Status: SHIPPED | OUTPATIENT
Start: 2020-01-01 | End: 2020-01-01

## 2020-01-01 RX ORDER — SODIUM CHLORIDE 900 MG/100ML
INJECTION INTRAVENOUS
Status: DISPENSED
Start: 2020-01-01 | End: 2020-01-01

## 2020-01-01 RX ORDER — ADHESIVE BANDAGE
30 BANDAGE TOPICAL DAILY PRN
Status: DISCONTINUED | OUTPATIENT
Start: 2020-01-01 | End: 2020-01-01 | Stop reason: HOSPADM

## 2020-01-01 RX ORDER — HYDRALAZINE HYDROCHLORIDE 10 MG/1
10 TABLET, FILM COATED ORAL 2 TIMES DAILY
Status: DISCONTINUED | OUTPATIENT
Start: 2020-01-01 | End: 2020-01-01 | Stop reason: HOSPADM

## 2020-01-01 RX ORDER — DORZOLAMIDE/TIMOLOL/PF 2 %-0.5 %
DROPS OPHTHALMIC (EYE)
COMMUNITY

## 2020-01-01 RX ORDER — AZITHROMYCIN 500 MG/1
500 TABLET, FILM COATED ORAL SEE ADMIN INSTRUCTIONS
Status: DISCONTINUED | OUTPATIENT
Start: 2020-01-01 | End: 2020-01-01 | Stop reason: HOSPADM

## 2020-01-01 RX ORDER — DEXTROSE MONOHYDRATE AND SODIUM CHLORIDE 5; .45 G/100ML; G/100ML
75 INJECTION, SOLUTION INTRAVENOUS CONTINUOUS
Status: DISCONTINUED | OUTPATIENT
Start: 2020-01-01 | End: 2020-01-01

## 2020-01-01 RX ADMIN — LATANOPROST 1 DROP: 50 SOLUTION OPHTHALMIC at 22:00

## 2020-01-01 RX ADMIN — DEXAMETHASONE SODIUM PHOSPHATE 4 MG: 4 INJECTION, SOLUTION INTRAMUSCULAR; INTRAVENOUS at 09:22

## 2020-01-01 RX ADMIN — HEPARIN SODIUM 5000 UNITS: 10000 INJECTION, SOLUTION INTRAVENOUS; SUBCUTANEOUS at 21:35

## 2020-01-01 RX ADMIN — HEPARIN SODIUM 5000 UNITS: 5000 INJECTION INTRAVENOUS; SUBCUTANEOUS at 21:36

## 2020-01-01 RX ADMIN — HEPARIN SODIUM 5000 UNITS: 5000 INJECTION INTRAVENOUS; SUBCUTANEOUS at 06:00

## 2020-01-01 RX ADMIN — HEPARIN SODIUM 5000 UNITS: 5000 INJECTION INTRAVENOUS; SUBCUTANEOUS at 21:02

## 2020-01-01 RX ADMIN — HEPARIN SODIUM 5000 UNITS: 5000 INJECTION, SOLUTION INTRAVENOUS; SUBCUTANEOUS at 15:20

## 2020-01-01 RX ADMIN — TIMOLOL MALEATE 1 DROP: 5 SOLUTION/ DROPS OPHTHALMIC at 10:48

## 2020-01-01 RX ADMIN — DEXAMETHASONE SODIUM PHOSPHATE 4 MG: 4 INJECTION, SOLUTION INTRAMUSCULAR; INTRAVENOUS at 05:00

## 2020-01-01 RX ADMIN — DEXAMETHASONE SODIUM PHOSPHATE 4 MG: 4 INJECTION, SOLUTION INTRAMUSCULAR; INTRAVENOUS at 04:44

## 2020-01-01 RX ADMIN — CEFTRIAXONE 1 G: 1 INJECTION, POWDER, FOR SOLUTION INTRAMUSCULAR; INTRAVENOUS at 16:03

## 2020-01-01 RX ADMIN — DEXAMETHASONE SODIUM PHOSPHATE 4 MG: 4 INJECTION, SOLUTION INTRAMUSCULAR; INTRAVENOUS at 12:31

## 2020-01-01 RX ADMIN — DEXAMETHASONE SODIUM PHOSPHATE 4 MG: 4 INJECTION, SOLUTION INTRAMUSCULAR; INTRAVENOUS at 04:08

## 2020-01-01 RX ADMIN — DEXAMETHASONE SODIUM PHOSPHATE 4 MG: 4 INJECTION, SOLUTION INTRAMUSCULAR; INTRAVENOUS at 15:57

## 2020-01-01 RX ADMIN — DORZOLAMIDE HYDROCHLORIDE 1 DROP: 20 SOLUTION/ DROPS OPHTHALMIC at 09:12

## 2020-01-01 RX ADMIN — SODIUM CHLORIDE 100 MG: 9 INJECTION, SOLUTION INTRAVENOUS at 21:36

## 2020-01-01 RX ADMIN — SODIUM CHLORIDE 1000 ML: 9 INJECTION, SOLUTION INTRAVENOUS at 09:51

## 2020-01-01 RX ADMIN — DEXAMETHASONE SODIUM PHOSPHATE 4 MG: 4 INJECTION, SOLUTION INTRAMUSCULAR; INTRAVENOUS at 17:36

## 2020-01-01 RX ADMIN — DORZOLAMIDE HYDROCHLORIDE 1 DROP: 20 SOLUTION/ DROPS OPHTHALMIC at 08:27

## 2020-01-01 RX ADMIN — CEFTRIAXONE 1 G: 1 INJECTION, POWDER, FOR SOLUTION INTRAMUSCULAR; INTRAVENOUS at 17:10

## 2020-01-01 RX ADMIN — SODIUM CHLORIDE 100 MG: 9 INJECTION, SOLUTION INTRAVENOUS at 17:07

## 2020-01-01 RX ADMIN — DEXAMETHASONE SODIUM PHOSPHATE 4 MG: 4 INJECTION, SOLUTION INTRAMUSCULAR; INTRAVENOUS at 21:01

## 2020-01-01 RX ADMIN — HEPARIN SODIUM 5000 UNITS: 10000 INJECTION, SOLUTION INTRAVENOUS; SUBCUTANEOUS at 06:46

## 2020-01-01 RX ADMIN — DORZOLAMIDE HYDROCHLORIDE 1 DROP: 20 SOLUTION/ DROPS OPHTHALMIC at 21:37

## 2020-01-01 RX ADMIN — LATANOPROST 1 DROP: 50 SOLUTION OPHTHALMIC at 23:30

## 2020-01-01 RX ADMIN — HEPARIN SODIUM 5000 UNITS: 5000 INJECTION INTRAVENOUS; SUBCUTANEOUS at 05:09

## 2020-01-01 RX ADMIN — DORZOLAMIDE HYDROCHLORIDE 1 DROP: 20 SOLUTION/ DROPS OPHTHALMIC at 21:00

## 2020-01-01 RX ADMIN — TOCILIZUMAB 400 MG: 20 INJECTION, SOLUTION, CONCENTRATE INTRAVENOUS at 15:58

## 2020-01-01 RX ADMIN — TIMOLOL MALEATE 1 DROP: 5 SOLUTION/ DROPS OPHTHALMIC at 09:12

## 2020-01-01 RX ADMIN — HYDRALAZINE HYDROCHLORIDE 10 MG: 10 TABLET, FILM COATED ORAL at 09:11

## 2020-01-01 RX ADMIN — DEXTROSE MONOHYDRATE 75 ML/HR: 50 INJECTION, SOLUTION INTRAVENOUS at 06:02

## 2020-01-01 RX ADMIN — DORZOLAMIDE HYDROCHLORIDE 1 DROP: 20 SOLUTION/ DROPS OPHTHALMIC at 09:27

## 2020-01-01 RX ADMIN — TIMOLOL MALEATE 1 DROP: 5 SOLUTION/ DROPS OPHTHALMIC at 09:27

## 2020-01-01 RX ADMIN — DEXAMETHASONE SODIUM PHOSPHATE 4 MG: 4 INJECTION, SOLUTION INTRAMUSCULAR; INTRAVENOUS at 21:05

## 2020-01-01 RX ADMIN — HEPARIN SODIUM 5000 UNITS: 5000 INJECTION, SOLUTION INTRAVENOUS; SUBCUTANEOUS at 05:13

## 2020-01-01 RX ADMIN — SODIUM CHLORIDE 1000 ML: 9 INJECTION, SOLUTION INTRAVENOUS at 15:05

## 2020-01-01 RX ADMIN — TIMOLOL MALEATE 1 DROP: 5 SOLUTION/ DROPS OPHTHALMIC at 23:08

## 2020-01-01 RX ADMIN — DEXAMETHASONE SODIUM PHOSPHATE 4 MG: 4 INJECTION, SOLUTION INTRAMUSCULAR; INTRAVENOUS at 23:08

## 2020-01-01 RX ADMIN — TIMOLOL MALEATE 1 DROP: 5 SOLUTION/ DROPS OPHTHALMIC at 21:02

## 2020-01-01 RX ADMIN — CEFTRIAXONE 1 G: 1 INJECTION, POWDER, FOR SOLUTION INTRAMUSCULAR; INTRAVENOUS at 17:36

## 2020-01-01 RX ADMIN — DEXAMETHASONE SODIUM PHOSPHATE 4 MG: 4 INJECTION, SOLUTION INTRAMUSCULAR; INTRAVENOUS at 10:42

## 2020-01-01 RX ADMIN — DEXTROSE MONOHYDRATE 75 ML/HR: 50 INJECTION, SOLUTION INTRAVENOUS at 15:20

## 2020-01-01 RX ADMIN — HEPARIN SODIUM 5000 UNITS: 5000 INJECTION INTRAVENOUS; SUBCUTANEOUS at 05:17

## 2020-01-01 RX ADMIN — HEPARIN SODIUM 5000 UNITS: 10000 INJECTION, SOLUTION INTRAVENOUS; SUBCUTANEOUS at 14:40

## 2020-01-01 RX ADMIN — HEPARIN SODIUM 5000 UNITS: 5000 INJECTION, SOLUTION INTRAVENOUS; SUBCUTANEOUS at 23:07

## 2020-01-01 RX ADMIN — LATANOPROST 1 DROP: 50 SOLUTION OPHTHALMIC at 23:08

## 2020-01-01 RX ADMIN — CEFTRIAXONE 1 G: 1 INJECTION, POWDER, FOR SOLUTION INTRAMUSCULAR; INTRAVENOUS at 18:36

## 2020-01-01 RX ADMIN — DEXAMETHASONE SODIUM PHOSPHATE 4 MG: 4 INJECTION, SOLUTION INTRAMUSCULAR; INTRAVENOUS at 21:25

## 2020-01-01 RX ADMIN — DORZOLAMIDE HYDROCHLORIDE 1 DROP: 20 SOLUTION/ DROPS OPHTHALMIC at 23:08

## 2020-01-01 RX ADMIN — DORZOLAMIDE HYDROCHLORIDE 1 DROP: 20 SOLUTION/ DROPS OPHTHALMIC at 14:25

## 2020-01-01 RX ADMIN — HEPARIN SODIUM 5000 UNITS: 5000 INJECTION, SOLUTION INTRAVENOUS; SUBCUTANEOUS at 21:17

## 2020-01-01 RX ADMIN — PIPERACILLIN SODIUM AND TAZOBACTAM SODIUM 4.5 G: 4; .5 INJECTION, POWDER, LYOPHILIZED, FOR SOLUTION INTRAVENOUS at 15:05

## 2020-01-01 RX ADMIN — DEXAMETHASONE SODIUM PHOSPHATE 4 MG: 4 INJECTION, SOLUTION INTRAMUSCULAR; INTRAVENOUS at 17:56

## 2020-01-01 RX ADMIN — DEXAMETHASONE SODIUM PHOSPHATE 4 MG: 4 INJECTION, SOLUTION INTRAMUSCULAR; INTRAVENOUS at 05:56

## 2020-01-01 RX ADMIN — CEFTRIAXONE 1 G: 1 INJECTION, POWDER, FOR SOLUTION INTRAMUSCULAR; INTRAVENOUS at 17:56

## 2020-01-01 RX ADMIN — DEXAMETHASONE SODIUM PHOSPHATE 4 MG: 4 INJECTION, SOLUTION INTRAMUSCULAR; INTRAVENOUS at 14:24

## 2020-01-01 RX ADMIN — DEXAMETHASONE SODIUM PHOSPHATE 4 MG: 4 INJECTION, SOLUTION INTRAMUSCULAR; INTRAVENOUS at 17:10

## 2020-01-01 RX ADMIN — TIMOLOL MALEATE 1 DROP: 5 SOLUTION/ DROPS OPHTHALMIC at 09:22

## 2020-01-01 RX ADMIN — DEXAMETHASONE SODIUM PHOSPHATE 4 MG: 4 INJECTION, SOLUTION INTRAMUSCULAR; INTRAVENOUS at 09:02

## 2020-01-01 RX ADMIN — HEPARIN SODIUM 5000 UNITS: 5000 INJECTION, SOLUTION INTRAVENOUS; SUBCUTANEOUS at 05:00

## 2020-01-01 RX ADMIN — LATANOPROST 1 DROP: 50 SOLUTION OPHTHALMIC at 21:36

## 2020-01-01 RX ADMIN — DORZOLAMIDE HYDROCHLORIDE 1 DROP: 20 SOLUTION/ DROPS OPHTHALMIC at 21:06

## 2020-01-01 RX ADMIN — POTASSIUM CHLORIDE 10 MEQ: 7.46 INJECTION, SOLUTION INTRAVENOUS at 21:46

## 2020-01-01 RX ADMIN — HEPARIN SODIUM 5000 UNITS: 5000 INJECTION, SOLUTION INTRAVENOUS; SUBCUTANEOUS at 05:56

## 2020-01-01 RX ADMIN — TIMOLOL MALEATE 1 DROP: 5 SOLUTION/ DROPS OPHTHALMIC at 12:24

## 2020-01-01 RX ADMIN — POTASSIUM CHLORIDE 10 MEQ: 7.46 INJECTION, SOLUTION INTRAVENOUS at 18:02

## 2020-01-01 RX ADMIN — SODIUM CHLORIDE 100 MG: 9 INJECTION, SOLUTION INTRAVENOUS at 18:07

## 2020-01-01 RX ADMIN — POTASSIUM CHLORIDE 40 MEQ: 750 TABLET, FILM COATED, EXTENDED RELEASE ORAL at 01:51

## 2020-01-01 RX ADMIN — SODIUM CHLORIDE 200 MG: 9 INJECTION, SOLUTION INTRAVENOUS at 18:18

## 2020-01-01 RX ADMIN — HEPARIN SODIUM 5000 UNITS: 5000 INJECTION INTRAVENOUS; SUBCUTANEOUS at 05:50

## 2020-01-01 RX ADMIN — TIMOLOL MALEATE 1 DROP: 5 SOLUTION/ DROPS OPHTHALMIC at 14:25

## 2020-01-01 RX ADMIN — DORZOLAMIDE HYDROCHLORIDE 1 DROP: 20 SOLUTION/ DROPS OPHTHALMIC at 12:24

## 2020-01-01 RX ADMIN — DEXAMETHASONE SODIUM PHOSPHATE 4 MG: 4 INJECTION, SOLUTION INTRAMUSCULAR; INTRAVENOUS at 00:51

## 2020-01-01 RX ADMIN — DEXAMETHASONE SODIUM PHOSPHATE 4 MG: 4 INJECTION, SOLUTION INTRAMUSCULAR; INTRAVENOUS at 05:17

## 2020-01-01 RX ADMIN — DEXAMETHASONE SODIUM PHOSPHATE 4 MG: 4 INJECTION, SOLUTION INTRAMUSCULAR; INTRAVENOUS at 21:36

## 2020-01-01 RX ADMIN — HYDRALAZINE HYDROCHLORIDE 10 MG: 10 TABLET, FILM COATED ORAL at 21:34

## 2020-01-01 RX ADMIN — DEXTROSE AND SODIUM CHLORIDE 75 ML/HR: 5; 450 INJECTION, SOLUTION INTRAVENOUS at 23:49

## 2020-01-01 RX ADMIN — DORZOLAMIDE HYDROCHLORIDE 1 DROP: 20 SOLUTION/ DROPS OPHTHALMIC at 09:22

## 2020-01-01 RX ADMIN — TIMOLOL MALEATE 1 DROP: 5 SOLUTION/ DROPS OPHTHALMIC at 23:30

## 2020-01-01 RX ADMIN — DORZOLAMIDE HYDROCHLORIDE 1 DROP: 20 SOLUTION/ DROPS OPHTHALMIC at 21:36

## 2020-01-01 RX ADMIN — DORZOLAMIDE HYDROCHLORIDE 1 DROP: 20 SOLUTION/ DROPS OPHTHALMIC at 08:38

## 2020-01-01 RX ADMIN — DORZOLAMIDE HYDROCHLORIDE 1 DROP: 20 SOLUTION/ DROPS OPHTHALMIC at 21:02

## 2020-01-01 RX ADMIN — AMLODIPINE BESYLATE 5 MG: 5 TABLET ORAL at 09:11

## 2020-01-01 RX ADMIN — TIMOLOL MALEATE 1 DROP: 5 SOLUTION/ DROPS OPHTHALMIC at 21:36

## 2020-01-01 RX ADMIN — SODIUM CHLORIDE 100 MG: 9 INJECTION, SOLUTION INTRAVENOUS at 16:03

## 2020-01-01 RX ADMIN — HEPARIN SODIUM 5000 UNITS: 5000 INJECTION INTRAVENOUS; SUBCUTANEOUS at 21:05

## 2020-01-01 RX ADMIN — HEPARIN SODIUM 5000 UNITS: 5000 INJECTION INTRAVENOUS; SUBCUTANEOUS at 13:06

## 2020-01-01 RX ADMIN — DEXAMETHASONE SODIUM PHOSPHATE 4 MG: 4 INJECTION, SOLUTION INTRAMUSCULAR; INTRAVENOUS at 05:13

## 2020-01-01 RX ADMIN — CEFTRIAXONE 1 G: 1 INJECTION, POWDER, FOR SOLUTION INTRAMUSCULAR; INTRAVENOUS at 16:53

## 2020-01-01 RX ADMIN — SODIUM CHLORIDE 75 ML/HR: 9 INJECTION, SOLUTION INTRAVENOUS at 11:13

## 2020-01-01 RX ADMIN — DEXAMETHASONE SODIUM PHOSPHATE 4 MG: 4 INJECTION, SOLUTION INTRAMUSCULAR; INTRAVENOUS at 18:35

## 2020-01-01 RX ADMIN — DEXTROSE MONOHYDRATE 50 ML/HR: 50 INJECTION, SOLUTION INTRAVENOUS at 17:05

## 2020-01-01 RX ADMIN — CEFTRIAXONE 1 G: 1 INJECTION, POWDER, FOR SOLUTION INTRAMUSCULAR; INTRAVENOUS at 17:08

## 2020-01-01 RX ADMIN — HYDROXYZINE PAMOATE 25 MG: 25 CAPSULE ORAL at 21:34

## 2020-01-01 RX ADMIN — HEPARIN SODIUM 5000 UNITS: 10000 INJECTION, SOLUTION INTRAVENOUS; SUBCUTANEOUS at 14:45

## 2020-01-01 RX ADMIN — DEXTROSE AND SODIUM CHLORIDE 75 ML/HR: 5; 450 INJECTION, SOLUTION INTRAVENOUS at 14:26

## 2020-01-01 RX ADMIN — DEXAMETHASONE SODIUM PHOSPHATE 4 MG: 4 INJECTION, SOLUTION INTRAMUSCULAR; INTRAVENOUS at 12:17

## 2020-01-01 RX ADMIN — HEPARIN SODIUM 5000 UNITS: 5000 INJECTION INTRAVENOUS; SUBCUTANEOUS at 15:57

## 2020-01-01 RX ADMIN — DEXTROSE AND SODIUM CHLORIDE 75 ML/HR: 5; 450 INJECTION, SOLUTION INTRAVENOUS at 18:35

## 2020-01-01 RX ADMIN — TIMOLOL MALEATE 1 DROP: 5 SOLUTION/ DROPS OPHTHALMIC at 08:27

## 2020-01-01 RX ADMIN — HEPARIN SODIUM 5000 UNITS: 5000 INJECTION INTRAVENOUS; SUBCUTANEOUS at 21:25

## 2020-01-01 RX ADMIN — DORZOLAMIDE HYDROCHLORIDE 1 DROP: 20 SOLUTION/ DROPS OPHTHALMIC at 23:30

## 2020-01-01 RX ADMIN — TIMOLOL MALEATE 1 DROP: 5 SOLUTION/ DROPS OPHTHALMIC at 21:00

## 2020-01-01 RX ADMIN — POTASSIUM CHLORIDE 10 MEQ: 7.46 INJECTION, SOLUTION INTRAVENOUS at 16:07

## 2020-01-01 RX ADMIN — TIMOLOL MALEATE 1 DROP: 5 SOLUTION/ DROPS OPHTHALMIC at 08:38

## 2020-01-01 RX ADMIN — HEPARIN SODIUM 5000 UNITS: 5000 INJECTION INTRAVENOUS; SUBCUTANEOUS at 17:08

## 2020-01-01 RX ADMIN — DEXAMETHASONE SODIUM PHOSPHATE 4 MG: 4 INJECTION, SOLUTION INTRAMUSCULAR; INTRAVENOUS at 10:40

## 2020-01-01 RX ADMIN — TIMOLOL MALEATE 1 DROP: 5 SOLUTION/ DROPS OPHTHALMIC at 21:06

## 2020-01-01 RX ADMIN — HEPARIN SODIUM 5000 UNITS: 5000 INJECTION INTRAVENOUS; SUBCUTANEOUS at 14:23

## 2020-01-01 RX ADMIN — HEPARIN SODIUM 5000 UNITS: 5000 INJECTION, SOLUTION INTRAVENOUS; SUBCUTANEOUS at 23:31

## 2020-01-01 RX ADMIN — CEFTRIAXONE SODIUM 1 G: 1 INJECTION, POWDER, FOR SOLUTION INTRAMUSCULAR; INTRAVENOUS at 06:46

## 2020-01-01 RX ADMIN — DORZOLAMIDE HYDROCHLORIDE 1 DROP: 20 SOLUTION/ DROPS OPHTHALMIC at 10:48

## 2020-01-01 RX ADMIN — DEXAMETHASONE SODIUM PHOSPHATE 4 MG: 4 INJECTION, SOLUTION INTRAMUSCULAR; INTRAVENOUS at 17:08

## 2020-01-01 RX ADMIN — POTASSIUM CHLORIDE 10 MEQ: 7.46 INJECTION, SOLUTION INTRAVENOUS at 23:15

## 2020-01-01 RX ADMIN — DEXAMETHASONE SODIUM PHOSPHATE 4 MG: 4 INJECTION, SOLUTION INTRAMUSCULAR; INTRAVENOUS at 05:50

## 2020-01-01 RX ADMIN — HEPARIN SODIUM 5000 UNITS: 5000 INJECTION, SOLUTION INTRAVENOUS; SUBCUTANEOUS at 17:56

## 2020-01-01 RX ADMIN — ACETAMINOPHEN 650 MG: 325 TABLET, FILM COATED ORAL at 21:25

## 2020-09-12 NOTE — PROGRESS NOTES
Patient has been accepted to [facility] once she is medically stable and has been out of the posey bed x 24 hours. Notified patient's daughter of the possibility of discharge tomorrow 9/13/2020. She inquired about the patient remaining in the hospital until Monday to give her time to visit. Explained that she is unable to stay without medical necessity if we have a safe discharge plan. The only option is to appeal. She acknowledged her understanding of this and seemed okay with potential discharge tomorrow. Patient seen and examined at bedside, resting comfortably. Pain well controlled. Denies headache/dizziness/lightheadedness, chest pain, dyspnea, n/v, numbness/tingling. No complaints at this time. No overnight events.    LABS:                        7.9    17.77 )-----------( 211      ( 21 Aug 2020 16:39 )             24.7     08-21    138  |  106  |  19  ----------------------------<  139<H>  4.4   |  26  |  0.91    Ca    8.9      21 Aug 2020 07:57    TPro  5.7<L>  /  Alb  2.9<L>  /  TBili  0.4  /  DBili  x   /  AST  15  /  ALT  16  /  AlkPhos  70  08-21        Vital Signs Last 24 Hrs  T(C): 36.6 (22 Aug 2020 04:35), Max: 36.8 (21 Aug 2020 20:32)  T(F): 97.9 (22 Aug 2020 04:35), Max: 98.3 (21 Aug 2020 20:32)  HR: 90 (22 Aug 2020 06:02) (89 - 94)  BP: 120/60 (22 Aug 2020 06:02) (94/55 - 120/60)  BP(mean): --  RR: 17 (22 Aug 2020 06:02) (17 - 18)  SpO2: 96% (22 Aug 2020 06:02) (91% - 97%)      Exam:  Gen: NAD, Awake and alert, following commands  LLE:  Dressing clean and dry  +EHL/FHL/TA/GS  SILT L2-S1  +DP  Calf Soft NT  Compartments soft and compressible DISPLAY PLAN FREE TEXT

## 2020-09-12 NOTE — PROGRESS NOTES
Last OV: 8/31/20  Last labs: 3/7/20    No future appointments. OT entering chart to acknowledge duplicate OT eval order as pt already being followed by OT as well as add in care plan.

## 2020-09-12 NOTE — PROGRESS NOTES
Progress Note Date:2020       Room:[facility] Patient Name:Esther Babcock     YOB: 1926     Age:89 y.o. Subjective Subjective: Activity level: Normal with assistance. Pain:  She reports no pain. No acute events overnight. No acute distress. Patient denies pain. Review of Systems Constitutional: Negative. HENT: Negative. Eyes: Negative. Respiratory: Negative. Cardiovascular: Negative. Gastrointestinal: Negative. Genitourinary: Negative. Musculoskeletal: Negative. Skin: Negative. Neurological: Negative. Psychiatric/Behavioral: Negative. Objective Vitals Last 24 Hours: TEMPERATURE:  Temp  Av.5 °F (36.9 °C)  Min: 98.5 °F (36.9 °C)  Max: 98.5 °F (36.9 °C) RESPIRATIONS RANGE: Resp  Av  Min: 16  Max: 16 PULSE OXIMETRY RANGE: SpO2  Av %  Min: 98 %  Max: 98 % PULSE RANGE: Pulse  Av  Min: 72  Max: 72 BLOOD PRESSURE RANGE: Systolic (28FDJ), KLI:331 , Min:151 , QAD:076  
; Diastolic (37KHP), VWD:53, Min:70, Max:70 I/O (24Hr): No intake or output data in the 24 hours ending 20 1422 Objective: 
General Appearance:  Comfortable, in no acute distress and not in pain. Vital signs: (most recent): Blood pressure (!) 151/70, pulse 72, temperature 98.5 °F (36.9 °C), resp. rate 16, height 5' 1.02\" (1.55 m), weight 35 kg (77 lb 2.6 oz), SpO2 98 %. HEENT: Normal HEENT exam.   
Heart: Normal rate. Regular rhythm. S1 normal and S2 normal.   
Abdomen: Abdomen is soft. Bowel sounds are normal.   There is no abdominal tenderness. Extremities: Normal range of motion. Pulses: Distal pulses are intact. Neurological: Patient is alert. GCS score is 14. (Patient not oriented to time (year)). Pupils:  Pupils are equal, round, and reactive to light. Skin:  Warm and dry. Labs/Imaging/Diagnostics Labs: 
CBC:No results for input(s): WBC, RBC, HGB, HCT, MCV, RDW, PLT, HGBEXT, HCTEXT, PLTEXT in the last 72 hours. CHEMISTRIES:No results for input(s): NA, K, CL, CO2, BUN, CA, PHOS, MG in the last 72 hours. No lab exists for component: CREATININE, GLUCOSEPT/INR:No results for input(s): INR, INREXT in the last 72 hours. No lab exists for component: PROTIME APTT:No results for input(s): APTT in the last 72 hours. LIVER PROFILE:No results for input(s): AST, ALT in the last 72 hours. No lab exists for component: BILIDIR, BILITOT, ALKPHOS No results found for: ALT, AST, GGT, GGTP, AP, APIT, APX, CBIL, TBIL, TBILI Imaging Last 24 Hours: 
No results found. Assessment//Plan UTI - Ceftriaxone 1g Daily, Urine culture growing gram negative rods. Acute Confusion - Resolving. Reorient patient as needed. Safety precautions including fall precautions. Discontinue Posey bed. Plan: Continue Antibiotic Therapy IV. Covid test for placement. Discontinue Posey bed. Safety Precautions Discharge Disposition: Pending placement.   
 
 
Electronically signed by Travis Alcantara NP on 9/12/2020 at 2:22 PM

## 2020-09-13 NOTE — PROGRESS NOTES
Problem: Mobility Impaired (Adult and Pediatric) Goal: *Acute Goals and Plan of Care (Insert Text) Outcome: Progressing Towards Goal 
Note: Pt will be I with all transfers x 7 days Pt will be able to amb >250ft with LRAD or without AD IND x 7 days Pt will be IND with HEP x 7 days

## 2020-09-13 NOTE — PROGRESS NOTES
Problem: Pressure Injury - Risk of 
Goal: *Prevention of pressure injury Description: Document Marcello Scale and appropriate interventions in the flowsheet. Outcome: Progressing Towards Goal 
Note: Pressure Injury Interventions: 
Sensory Interventions: Maintain/enhance activity level, Minimize linen layers, Keep linens dry and wrinkle-free Moisture Interventions: Check for incontinence Q2 hours and as needed, Minimize layers, Offer toileting Q_hr Activity Interventions: PT/OT evaluation, Increase time out of bed Mobility Interventions: PT/OT evaluation Nutrition Interventions: Document food/fluid/supplement intake, Discuss nutritional consult with provider, Offer support with meals,snacks and hydration Problem: Patient Education: Go to Patient Education Activity Goal: Patient/Family Education Outcome: Progressing Towards Goal 
  
Problem: Patient Education: Go to Patient Education Activity Goal: Patient/Family Education Description: Pt will demonstrate safe transfer techniques in prep for self care tasks Outcome: Progressing Towards Goal 
  
Problem: Delirium Treatment Goal: *Level of consciousness restored to baseline Outcome: Progressing Towards Goal 
Goal: *Level of environmental perceptions restored to baseline Outcome: Progressing Towards Goal 
Goal: *Sensory perception restored to baseline Outcome: Progressing Towards Goal 
Goal: *Emotional stability restored to baseline Outcome: Progressing Towards Goal 
Goal: *Functional assessment restored to baseline Outcome: Progressing Towards Goal 
Goal: *Absence of falls Outcome: Progressing Towards Goal 
Goal: *Cognitive status will be restored to baseline Outcome: Progressing Towards Goal 
Goal: Interventions Outcome: Progressing Towards Goal 
  
Problem: Patient Education: Go to Patient Education Activity Goal: Patient/Family Education Outcome: Progressing Towards Goal

## 2020-09-13 NOTE — PROGRESS NOTES
Patient is clear to d/c to [facility] room 212B, nurse report can be called to [phone]. CM spoke with patient's daughter, Ángela Lovett regarding DC, Ms. Ángela Lovett requested transort to be set for 6:00 PM.  CM has notified nurse. CM to fax DC summary and MARs to [facility].

## 2020-09-13 NOTE — PROGRESS NOTES
Discharge instructions completed and reviewed with pt daughter Palak Salcido. Copy of discharge instructions sent with pt to [facility] and Rehab. Report called to [phone] at [facility]. IV removed and all pt belongings packed and discharged with pt. Pt discharged via [employer] crew. Pt stable upon discharge.

## 2020-09-13 NOTE — DISCHARGE SUMMARY
Admit date: 9/9/2020 Admitting Provider: Fredy Duncan MD 
 
Discharge date: 9/13/2020 Discharging Provider: Tootie Carbajal NP 
 
 
* Admission Diagnoses: UTI Metabolic Encephalopathy * Discharge Diagnoses:  UTI, Altered Mental Status * Hospital Course: 80year old woman with a medical history of dementia who presents to the ED today with increasing confusion. Of note patient is alert and oriented x1. Patient is unaware she is in the hospital. She states that she is only here to visit her daughter. Patient denies any fevers chills nausea vomiting lightheadedness dizziness dyspnea orthopnea paroxysmal nocturnal dyspnea chest pain palpitations headache focal weakness loss of sensation auditory or visual symptoms abdominal stool or urinary complaints or any other associated symptoms. Patient endorses no recent sick contacts or travel activity. Urine cx showed Enterobacter cloacae w/ sensitivity to ceftriaxone. Patient s/p tx x 4 days IV ceftriaxone. Acute Confusion currently at baseline. Reorient patient as needed. Safety precautions including fall precautions. Consults: None Significant Diagnostic Studies:  
 
Head ct: Technique: Routine volume acquisition of the head was obtained without IV contrast. Coronal and sagittal reformations were generated in soft 
tissue and bone kernels. Dose reduction: All CT scans at this facility 
are performed using dose reduction optimization techniques as 
appropriate to a performed exam including the following: Automated 
exposure control, adjustments of the mA and/or kV according to patient 
size, or use of iterative reconstruction technique. Comparison: None available. Findings: 
 
Generalized volume loss with commensurate prominence of the ventricles 
and sulci. Chele Antu No midline shift. The basal cisterns are patent. No acute hemorrhage, 
large territorial ischemia, or abnormal extra-axial fluid.  Confluent 
foci of hypodensity within the cerebral hemispheric white matter is 
nonspecific but most commonly associated with chronic small vessel 
ischemic changes. Intracranial vascular calcifications. Absent native lenses. The visualized paranasal sinuses and mastoid air 
cells are clear. Cerumen in the left EAC. No aggressive calvarial or 
extracalvarial lesion. Impression: No acute intracranial abnormality. Chronic findings as above. Electronically signed by Shan Morris on 09/09/2020 08:42:00 PM 
 
Chest xray: AP portable chest radiograph 7:19 PM 9/9/2020. No comparison. Slightly hyperexpanded lungs. No pulmonary infiltrate. Biapical pleural calcifications. No pleural effusion. No pneumothorax. No focal bone lesion. Electronically signed by Glenice Nyhan on 09/09/2020 07:58:36 PM 
 
 
Discharge Exam: 
Physical Exam 
HENT:  
   Head: Normocephalic. Nose: Nose normal.  
   Mouth/Throat:  
   Mouth: Mucous membranes are moist.  
Eyes:  
   Pupils: Pupils are equal, round, and reactive to light. Neck: Musculoskeletal: Normal range of motion. Cardiovascular:  
   Rate and Rhythm: Normal rate. Pulmonary:  
   Effort: Pulmonary effort is normal.  
Abdominal:  
   General: Abdomen is flat. Palpations: Abdomen is soft. Musculoskeletal: Normal range of motion. Skin: 
   General: Skin is warm and dry. Neurological:  
   Mental Status: She is alert. Mental status is at baseline. She is disoriented. Psychiatric:     
   Mood and Affect: Mood normal.  
 
 
 
* Discharge Condition: stable * Disposition: [facility] No current facility-administered medications on file prior to encounter. Current Outpatient Medications on File Prior to Encounter Medication Sig Dispense Refill  dorzolamide-timolol, PF, 2-0.5 % drop Apply  to eye.  latanoprost (XALATAN) 0.005 % ophthalmic solution Administer 1 Drop to both eyes nightly. * Follow-up Care/Patient Instructions: Activity: AS tolerated Diet: Regular Follow-up Information Follow up With Specialties Details Why Contact Info Francisca Mosley MD Family Medicine In 3 days  [address] [facility] [address] [facility] [address] [phone] Signed: 
He Salgado NP 
9/13/2020 
9:00 AM

## 2020-11-26 NOTE — ED PROVIDER NOTES
EMERGENCY DEPARTMENT HISTORY AND PHYSICAL EXAM 
 
 
Date: 11/26/2020 Patient Name: Teodora Calvo History of Presenting Illness Chief Complaint Patient presents with  Cough History Provided By: Patient and EMS 
 
HPI: Teodora Calvo, 80 y.o. female with a past medical history significant dementia presents to the ED with cc of cough onset a few days ago. Associated symptoms include fever. Patient stating that she has continued to cough and that she is taking her medicine like she is supposed to however she is not getting any better. Unsure how accurate this information is. Patient is from [place] [address], EMS called for cough and fever. There is no acute distress. Patient specifically denies any pain to include chest pain, abdominal pain. She specifically denies shortness of breath, wheezing, leg swelling, recent injury. History limited secondary to baseline dementia. +exposure to COVID-19 at [place] [address], place of residence. There are no other complaints, changes, or physical findings at this time. PCP: Wilfredo Braxton NP No current facility-administered medications on file prior to encounter. Current Outpatient Medications on File Prior to Encounter Medication Sig Dispense Refill  dorzolamide-timolol, PF, 2-0.5 % drop Apply  to eye.  latanoprost (XALATAN) 0.005 % ophthalmic solution Administer 1 Drop to both eyes nightly. Past History Past Medical History: No past medical history on file. Past Surgical History: No past surgical history on file. Family History: No family history on file. Social History: 
Social History Tobacco Use  Smoking status: Not on file Substance Use Topics  Alcohol use: Not on file  Drug use: Not on file Allergies: 
No Known Allergies Review of Systems Review of Systems Constitutional: Positive for fever. Negative for activity change and chills.   
HENT: Negative for congestion, ear pain, rhinorrhea, sneezing and sore throat. Eyes: Negative for pain and visual disturbance. Respiratory: Positive for cough. Negative for shortness of breath. Cardiovascular: Negative for chest pain. Gastrointestinal: Negative for abdominal pain, diarrhea, nausea and vomiting. Genitourinary: Negative for dysuria and hematuria. Musculoskeletal: Negative for gait problem. Skin: Negative for rash. Neurological: Negative for speech difficulty, weakness and headaches. Psychiatric/Behavioral: The patient is not nervous/anxious. All other systems reviewed and are negative. Physical Exam  
Physical Exam 
Vitals signs and nursing note reviewed. Constitutional:   
   General: She is not in acute distress. Appearance: She is cachectic. She is not ill-appearing or toxic-appearing. HENT:  
   Head: Normocephalic and atraumatic. Nose: Nose normal.  
   Mouth/Throat:  
   Mouth: Mucous membranes are moist.  
Eyes:  
   Extraocular Movements: Extraocular movements intact. Conjunctiva/sclera: Conjunctivae normal.  
   Pupils: Pupils are equal, round, and reactive to light. Neck: Musculoskeletal: Normal range of motion. Cardiovascular:  
   Rate and Rhythm: Normal rate. Pulses: Normal pulses. Heart sounds: Normal heart sounds. Pulmonary:  
   Effort: Pulmonary effort is normal. No tachypnea or respiratory distress. Breath sounds: Decreased breath sounds present. No wheezing, rhonchi or rales. Abdominal:  
   General: Bowel sounds are normal.  
   Palpations: Abdomen is soft. Tenderness: There is no abdominal tenderness. Musculoskeletal: Normal range of motion. General: No deformity or signs of injury. Skin: 
   General: Skin is warm and dry. Capillary Refill: Capillary refill takes less than 2 seconds. Findings: No rash. Neurological:  
   General: No focal deficit present. Mental Status: She is alert. Mental status is at baseline. GCS: GCS eye subscore is 4. GCS verbal subscore is 5. GCS motor subscore is 6. Cranial Nerves: No cranial nerve deficit. Sensory: Sensation is intact. Motor: Motor function is intact. Coordination: Coordination is intact. Comments: Oriented to person Psychiatric:     
   Mood and Affect: Mood normal.  
 
 
 
Diagnostic Study Results Labs - Recent Results (from the past 48 hour(s)) CBC WITH AUTOMATED DIFF Collection Time: 11/26/20  4:40 PM  
Result Value Ref Range WBC 7.9 3.6 - 11.0 K/uL  
 RBC 4.14 3.80 - 5.20 M/uL  
 HGB 12.7 11.5 - 16.0 g/dL HCT 37.8 35.0 - 47.0 % MCV 91.3 80.0 - 99.0 FL  
 MCH 30.7 26.0 - 34.0 PG  
 MCHC 33.6 30.0 - 36.5 g/dL  
 RDW 14.3 11.5 - 14.5 % PLATELET 415 855 - 984 K/uL MPV 9.8 8.9 - 12.9 FL  
 NRBC 0.0 0  WBC ABSOLUTE NRBC 0.00 0.00 - 0.01 K/uL NEUTROPHILS 82 (H) 32 - 75 % LYMPHOCYTES 10 (L) 12 - 49 % MONOCYTES 8 5 - 13 % EOSINOPHILS 0 0 - 7 % BASOPHILS 0 0 - 1 % IMMATURE GRANULOCYTES 0 0.0 - 0.5 % ABS. NEUTROPHILS 6.4 1.8 - 8.0 K/UL  
 ABS. LYMPHOCYTES 0.8 0.8 - 3.5 K/UL  
 ABS. MONOCYTES 0.6 0.0 - 1.0 K/UL  
 ABS. EOSINOPHILS 0.0 0.0 - 0.4 K/UL  
 ABS. BASOPHILS 0.0 0.0 - 0.1 K/UL  
 ABS. IMM. GRANS. 0.0 0.00 - 0.04 K/UL  
 DF AUTOMATED METABOLIC PANEL, BASIC Collection Time: 11/26/20  4:40 PM  
Result Value Ref Range Sodium 141 136 - 145 mmol/L Potassium 4.0 3.5 - 5.1 mmol/L Chloride 106 97 - 108 mmol/L  
 CO2 29 21 - 32 mmol/L Anion gap 6 5 - 15 mmol/L Glucose 104 (H) 65 - 100 mg/dL BUN 15 6 - 20 mg/dL Creatinine 0.60 0.55 - 1.02 mg/dL BUN/Creatinine ratio 25 (H) 12 - 20 GFR est AA >60 >60 ml/min/1.73m2 GFR est non-AA >60 >60 ml/min/1.73m2 Calcium 9.2 8.5 - 10.1 mg/dL LACTIC ACID Collection Time: 11/26/20  4:40 PM  
Result Value Ref Range Lactic acid 1.3 0.4 - 2.0 mmol/L Radiologic Studies - Results from [place] encounter on 11/26/20 XR CHEST PORT Narrative EXAMINATION: XR CHEST PORT 
 
HISTORY: Cough and fever COMPARISON: None available. FINDINGS: Single view. There is a 9 mm nodular density overlying the right upper 
lobe. There is biapical pleural-parenchymal scarring. There is no pneumothorax 
or pleural effusion. Heart size is normal. Thoracic aorta is atherosclerotic. There is a severe lower thoracic compression fracture deformity. Impression IMPRESSION:  
1. There is a 9 mm nodular density overlying the right upper lobe. This could be 
further evaluated with CT. 2. Biapical pleural parenchymal scarring. 3. Atherosclerotic disease. CT Results  (Last 48 hours) None Medical Decision Making I am the first provider for this patient. I reviewed the vital signs, available nursing notes, past medical history, past surgical history, family history and social history. Vital Signs-Reviewed the patient's vital signs. Patient Vitals for the past 12 hrs: 
 Temp Pulse Resp BP SpO2  
11/26/20 2001 99.2 °F (37.3 °C) 91 18 114/78 99 % 11/26/20 1842 99.1 °F (37.3 °C) 96 18 116/76 99 % 11/26/20 1658     99 % 11/26/20 1435 99.4 °F (37.4 °C) 88 16 (!) 152/76 99 % Records Reviewed: Nursing Notes and Old Medical Records Provider Notes (Medical Decision Making): MDM Number of Diagnoses or Management Options Cough:  
Pneumonia of right upper lobe due to infectious organism: Suspected COVID-19 virus infection:  
Diagnosis management comments: DDX: PNA, COVID-19, CHF, pleural effusion, pulmonary edema Spoke with [facility], relayed that patient was mildly hypoxic PTA and had a fever. She states that they are concerned about COVID-19 on the memory care unit and patient has a positive exposure. CXR with consolidation. Patient 97% on room air on arrival. Mild fever. VS stable.  Charge Nurse from [facility] that they are testing every patient on the unit tomorrow for COVID-19. She will have the test done tomorrow as opposed to here in the ED (she does not qualify for rapid testing). I have called her prescriptions in to her pharmacy and they will overnight them. Amount and/or Complexity of Data Reviewed Clinical lab tests: ordered and reviewed Tests in the radiology section of CPT®: ordered and reviewed ED Course:  
Initial assessment performed. The patients presenting problems have been discussed, and they are in agreement with the care plan formulated and outlined with them. I have encouraged them to ask questions as they arise throughout their visit. PROCEDURES Procedures DISPOSITION Discharged PLAN: 
1. Discharge Medication List as of 11/26/2020  7:36 PM  
  
START taking these medications Details  
azithromycin (Zithromax Z-Ye) 250 mg tablet Take 1 Tab by mouth See Admin Instructions for 6 days. Follow instructions on package, Normal, Disp-6 Tab,R-0  
  
levoFLOXacin (Levaquin) 750 mg tablet Take 1 Tab by mouth daily. , Normal, Disp-5 Tab,R-0 CONTINUE these medications which have NOT CHANGED Details  
dorzolamide-timolol, PF, 2-0.5 % drop Apply  to eye., Historical Med  
  
latanoprost (XALATAN) 0.005 % ophthalmic solution Administer 1 Drop to both eyes nightly., Historical Med 2.  
Follow-up Information Follow up With Specialties Details Why Contact Info Luis Jimenez NP Nurse Practitioner, Nurse Practitioner Schedule an appointment as soon as possible for a visit for follow up [place] [address] [facility] [place] 
[phone] [facility] EMERGENCY DEPT Emergency Medicine  As needed, If symptoms worsen [address] [place] [address] [phone] Return to ED if worse Diagnosis Clinical Impression: 1. Pneumonia of right upper lobe due to infectious organism 2. Suspected COVID-19 virus infection 3. Cough

## 2020-11-26 NOTE — ED TRIAGE NOTES
[facility] temp [facility] 100.9 baseline gcs 14 follows simple commands, non productive cough, frail female

## 2020-11-27 NOTE — DISCHARGE INSTRUCTIONS
Patient Education   Learning About Coronavirus [phone])  Coronavirus [phone]): Overview  What is coronavirus (ERQRS-75)? The coronavirus disease (COVID-19) is caused by a virus. It is an illness that was first found in [place], [place], in December 2019. It has since spread worldwide. The virus can cause fever, cough, and trouble breathing. In severe cases, it can cause pneumonia and make it hard to breathe without help. It can cause death. Coronaviruses are a large group of viruses. They cause the common cold. They also cause more serious illnesses like Middle East respiratory syndrome (MERS) and severe acute respiratory syndrome (SARS). COVID-19 is caused by a novel coronavirus. That means it's a new type that has not been seen in people before. This virus spreads person-to-person through droplets from coughing and sneezing. It can also spread when you are close to someone who is infected. And it can spread when you touch something that has the virus on it, such as a doorknob or a tabletop. What can you do to protect yourself from coronavirus (COVID-19)? The best way to protect yourself from getting sick is to:  · Avoid areas where there is an outbreak. · Avoid contact with people who may be infected. · Wash your hands often with soap or alcohol-based hand sanitizers. · Avoid crowds and try to stay at least 6 feet away from other people. · Wash your hands often, especially after you cough or sneeze. Use soap and water, and scrub for at least 20 seconds. If soap and water aren't available, use an alcohol-based hand . · Avoid touching your mouth, nose, and eyes. What can you do to avoid spreading the virus to others? To help avoid spreading the virus to others:  · Cover your mouth with a tissue when you cough or sneeze. Then throw the tissue in the trash. · Use a disinfectant to clean things that you touch often. · Stay home if you are sick or have been exposed to the virus.  Don't go to school, work, or public areas. And don't use public transportation. · If you are sick:  ? Leave your home only if you need to get medical care. But call the doctor's office first so they know you're coming. And wear a face mask, if you have one.  ? If you have a face mask, wear it whenever you're around other people. It can help stop the spread of the virus when you cough or sneeze. ? Clean and disinfect your home every day. Use household  and disinfectant wipes or sprays. Take special care to clean things that you grab with your hands. These include doorknobs, remote controls, phones, and handles on your refrigerator and microwave. And don't forget countertops, tabletops, bathrooms, and computer keyboards. When to call for help  Call 911 anytime you think you may need emergency care. For example, call if:  · You have severe trouble breathing. (You can't talk at all.)  · You have constant chest pain or pressure. · You are severely dizzy or lightheaded. · You are confused or can't think clearly. · Your face and lips have a blue color. · You pass out (lose consciousness) or are very hard to wake up. Call your doctor now if you develop symptoms such as:  · Shortness of breath. · Fever. · Cough. If you need to get care, call ahead to the doctor's office for instructions before you go. Make sure you wear a face mask, if you have one, to prevent exposing other people to the virus. Where can you get the latest information? The following health organizations are tracking and studying this virus. Their websites contain the most up-to-date information. Cynthia López also learn what to do if you think you may have been exposed to the virus. · U.S. Centers for Disease Control and Prevention (CDC): The CDC provides updated news about the disease and travel advice. The website also tells you how to prevent the spread of infection.  www.cdc.gov  · World Health Organization [facility]): WHO offers information about the virus outbreaks. WHO also has travel advice. www.who.int  Current as of: April 1, 2020               Content Version: 12.4  © 2006-2020 Health[employer], Incorporated. Care instructions adapted under license by your healthcare professional. If you have questions about a medical condition or this instruction, always ask your healthcare professional. [address] any warranty or liability for your use of this information. Patient Education        Pneumonia: Care Instructions  Your Care Instructions     Pneumonia is an infection of the lungs. Most cases are caused by infections from bacteria or viruses. Pneumonia may be mild or very severe. If it is caused by bacteria, you will be treated with antibiotics. It may take a few weeks to a few months to recover fully from pneumonia, depending on how sick you were and whether your overall health is good. Follow-up care is a key part of your treatment and safety. Be sure to make and go to all appointments, and call your doctor if you are having problems. It's also a good idea to know your test results and keep a list of the medicines you take. How can you care for yourself at home? · Take your antibiotics exactly as directed. Do not stop taking the medicine just because you are feeling better. You need to take the full course of antibiotics. · Take your medicines exactly as prescribed. Call your doctor if you think you are having a problem with your medicine. · Get plenty of rest and sleep. You may feel weak and tired for a while, but your energy level will improve with time. · To prevent dehydration, drink plenty of fluids, enough so that your urine is light yellow or clear like water. Choose water and other caffeine-free clear liquids until you feel better. If you have kidney, heart, or liver disease and have to limit fluids, talk with your doctor before you increase the amount of fluids you drink.   · Take care of your cough so you can rest. A cough that brings up mucus from your lungs is common with pneumonia. It is one way your body gets rid of the infection. But if coughing keeps you from resting or causes severe fatigue and chest-wall pain, talk to your doctor. He or she may suggest that you take a medicine to reduce the cough. · Use a vaporizer or humidifier to add moisture to your bedroom. Follow the directions for cleaning the machine. · Do not smoke or allow others to smoke around you. Smoke will make your cough last longer. If you need help quitting, talk to your doctor about stop-smoking programs and medicines. These can increase your chances of quitting for good. · Take an over-the-counter pain medicine, such as acetaminophen (Tylenol), ibuprofen (Advil, Motrin), or naproxen (Aleve). Read and follow all instructions on the label. · Do not take two or more pain medicines at the same time unless the doctor told you to. Many pain medicines have acetaminophen, which is Tylenol. Too much acetaminophen (Tylenol) can be harmful. · If you were given a spirometer to measure how well your lungs are working, use it as instructed. This can help your doctor tell how your recovery is going. · To prevent pneumonia in the future, talk to your doctor about getting a flu vaccine (once a year) and a pneumococcal vaccine (one time only for most people). When should you call for help? Call 911 anytime you think you may need emergency care. For example, call if:    · You have severe trouble breathing. Call your doctor now or seek immediate medical care if:    · You cough up dark brown or bloody mucus (sputum).     · You have new or worse trouble breathing.     · You are dizzy or lightheaded, or you feel like you may faint. Watch closely for changes in your health, and be sure to contact your doctor if:    · You have a new or higher fever.     · You are coughing more deeply or more often.     · You are not getting better after 2 days (48 hours).      · You do not get better as expected. Where can you learn more? Go to http://www.[employer].com/  Enter D336 in the search box to learn more about \"Pneumonia: Care Instructions. \"  Current as of: February 24, 2020               Content Version: 12.6  © 5428-2215 [employer]. Care instructions adapted under license by [employer] (which disclaims liability or warranty for this information). If you have questions about a medical condition or this instruction, always ask your healthcare professional. [address] any warranty or liability for your use of this information.

## 2020-11-27 NOTE — ED NOTES
Pt left with [facility] to transport back to [facility]. Pt stable at [facility]. Report calld to DOA at facility.

## 2020-11-30 PROBLEM — J96.00 ACUTE RESPIRATORY FAILURE (HCC): Status: ACTIVE | Noted: 2020-01-01

## 2020-11-30 PROBLEM — U07.1 COVID-19: Status: ACTIVE | Noted: 2020-01-01

## 2020-11-30 NOTE — ED PROVIDER NOTES
EMERGENCY DEPARTMENT HISTORY AND PHYSICAL EXAM 
 
 
 
Date: 11/30/2020 Patient Name: Raymond Bryant History of Presenting Illness Chief Complaint Patient presents with  Blood infection History Provided By: ems HPI: Raymodn Bryant, 80 y.o. female with history of dementia who presents from [address] [place] [address] with worsening shortness of breath. EMS reports that patient is positive for COVID-19. During rounds this morning nurses noticed that patient was having increased difficulty breathing and had hypoxia on room air. EMS was called to transfer to the ER. Patient has dementia and cannot provide any further history. PCP: Susanne Arciniega MD 
 
Current Facility-Administered Medications Medication Dose Route Frequency Provider Last Rate Last Dose  dextrose 5 % - 0.45% NaCl infusion  75 mL/hr IntraVENous CONTINUOUS Mathieu العلي DO      
 
Current Outpatient Medications Medication Sig Dispense Refill  azithromycin (Zithromax Z-Ye) 250 mg tablet Take 1 Tab by mouth See Admin Instructions for 6 days. Follow instructions on package 6 Tab 0  
 levoFLOXacin (Levaquin) 750 mg tablet Take 1 Tab by mouth daily. 5 Tab 0  
 dorzolamide-timolol, PF, 2-0.5 % drop Apply  to eye.  latanoprost (XALATAN) 0.005 % ophthalmic solution Administer 1 Drop to both eyes nightly. Past History Past Medical History: 
Past Medical History:  
Diagnosis Date  Dementia ([address]  DNR (do not resuscitate)  Hypertension Past Surgical History: No past surgical history on file. Family History: No family history on file. Social History: 
Social History Tobacco Use  Smoking status: Not on file Substance Use Topics  Alcohol use: Not on file  Drug use: Not on file Allergies: 
No Known Allergies Review of Systems Review of Systems Unable to perform ROS: Dementia Physical Exam  
General: No acute distress. Well-nourished. Skin: No rash. Head: Normocephalic. Atraumatic. Eye: No proptosis or conjunctival injections. Respiratory: No apparent respiratory distress. Gastrointestinal: Nondistended. Musculoskeletal: No obvious bony deformities. Psychiatric: Cooperative. Appropriate mood and affect. Diagnostic Study Results Labs - Recent Results (from the past 24 hour(s)) CBC WITH AUTOMATED DIFF Collection Time: 11/30/20 10:00 AM  
Result Value Ref Range WBC 11.9 (H) 3.6 - 11.0 K/uL  
 RBC 4.54 3.80 - 5.20 M/uL  
 HGB 14.0 11.5 - 16.0 g/dL HCT 43.9 35.0 - 47.0 % MCV 96.7 80.0 - 99.0 FL  
 MCH 30.8 26.0 - 34.0 PG  
 MCHC 31.9 30.0 - 36.5 g/dL  
 RDW 14.7 (H) 11.5 - 14.5 % PLATELET 014 738 - 487 K/uL MPV 10.7 8.9 - 12.9 FL  
 NEUTROPHILS 83 (H) 32 - 75 % LYMPHOCYTES 8 (L) 12 - 49 % MONOCYTES 8 5 - 13 % EOSINOPHILS 0 0 - 7 % BASOPHILS 0 0 - 1 % IMMATURE GRANULOCYTES 1 (H) 0.0 - 0.5 % ABS. NEUTROPHILS 10.1 (H) 1.8 - 8.0 K/UL  
 ABS. LYMPHOCYTES 0.9 0.8 - 3.5 K/UL  
 ABS. MONOCYTES 0.9 0.0 - 1.0 K/UL  
 ABS. EOSINOPHILS 0.0 0.0 - 0.4 K/UL  
 ABS. BASOPHILS 0.0 0.0 - 0.1 K/UL  
 ABS. IMM. GRANS. 0.1 (H) 0.00 - 0.04 K/UL  
 DF AUTOMATED METABOLIC PANEL, COMPREHENSIVE Collection Time: 11/30/20 10:00 AM  
Result Value Ref Range Sodium 159 (H) 136 - 145 mmol/L Potassium 2.9 (L) 3.5 - 5.1 mmol/L Chloride 122 (H) 97 - 108 mmol/L  
 CO2 29 21 - 32 mmol/L Anion gap 8 5 - 15 mmol/L Glucose 110 (H) 65 - 100 mg/dL BUN 50 (H) 6 - 20 mg/dL Creatinine 1.30 (H) 0.55 - 1.02 mg/dL BUN/Creatinine ratio 38 (H) 12 - 20 GFR est AA 46 (L) >60 ml/min/1.73m2 GFR est non-AA 38 (L) >60 ml/min/1.73m2 Calcium 9.7 8.5 - 10.1 mg/dL Bilirubin, total 0.4 0.2 - 1.0 mg/dL AST (SGOT) 26 15 - 37 U/L  
 ALT (SGPT) 14 12 - 78 U/L Alk. phosphatase 114 45 - 117 U/L Protein, total 8.7 (H) 6.4 - 8.2 g/dL Albumin 3.0 (L) 3.5 - 5.0 g/dL Globulin 5.7 (H) 2.0 - 4.0 g/dL  A-G Ratio 0.5 (L) 1.1 - 2.2 BNP Collection Time: 11/30/20 10:00 AM  
Result Value Ref Range NT pro- (H) <450 pg/mL LACTIC ACID Collection Time: 11/30/20 10:00 AM  
Result Value Ref Range Lactic acid 2.5 (HH) 0.4 - 2.0 mmol/L  
TROPONIN I Collection Time: 11/30/20 10:00 AM  
Result Value Ref Range Troponin-I, Qt. <0.05 <0.05 ng/mL Radiologic Studies -  
XR CHEST SNGL V Final Result FINDINGS: Impression: Frontal single view chest. Obscuring overlying cardiac  
leads. Normal heart size. Calcified aorta. No vascular congestion. The lungs are somewhat hyperinflated, likely from air trapping. Unchanged small  
opacity overlying the right fifth rib. Calcified biapical pleural-parenchymal  
thickening unchanged. No consolidation, effusion, pneumothorax. Bony demineralization. Thoracic and lumbar vertebral body compressions grossly  
similar to previous. Bilateral rib deformities similar to previous. No free air under the diaphragm. CT Results  (Last 48 hours) None CXR Results  (Last 48 hours)  
          
 11/30/20 1028  XR CHEST SNGL V Final result Impression:  FINDINGS: Impression: Frontal single view chest. Obscuring overlying cardiac  
leads. Normal heart size. Calcified aorta. No vascular congestion. The lungs are somewhat hyperinflated, likely from air trapping. Unchanged small  
opacity overlying the right fifth rib. Calcified biapical pleural-parenchymal  
thickening unchanged. No consolidation, effusion, pneumothorax. Bony demineralization. Thoracic and lumbar vertebral body compressions grossly  
similar to previous. Bilateral rib deformities similar to previous. No free air under the diaphragm. Narrative:  Dyspnea. Comparison chest x-ray 11/26/2020. Medical Decision Making and ED Course I reviewed the available vital signs, nursing notes, past medical history, past surgical history, family history, and social history. Vital Signs - Reviewed the patient's vital signs. Patient Vitals for the past 12 hrs: 
 Pulse Resp BP SpO2  
11/30/20 1200 (!) 112 22 121/76 93 % 11/30/20 1100 (!) 113 26 130/75 93 % 11/30/20 1000 (!) 113 26 133/74 93 % 11/30/20 0954 (!) 114 23 (!) 142/79 100 % EKG interpretation: Taken at 1259. Sinus tachycardia at 116 bpm.  Normal [place] interval, QRS duration, and QTc interval.  Normal axis. No ST segment abnormalities. Medical Decision Making:  
Presented with confusion and hypoxia. The differential diagnosis is pneumonia, COVID-19, acute respiratory failure, sepsis, UTI. Work-up shows hyponatremia. Suspect likely severe respiratory failure and possible sepsis. Treated with IV fluids. No obvious source of bacterial infection so no antibiotics have been given so far. Admitted to hospital physician for further evaluation and management. Procedures Critical Care Note:  
9:50 AM 
Amount of critical care time: 35 minutes Impending deterioration: Airway, CNS and Metabolic Associated risk factors: Hypotension, Shock and Hypoxia Management: Bedside Assessment and Supervision of Care Interpretation: Xrays, ECG and Blood Pressure Interventions: Fluids, oxygenation Case review: Nursing, hospital physician Treatment response: Stable Performed by: Self Notes: I have spent critical care time involved in lab review, decision making, bedside attention, and documentation. This time excludes time spent in any separate billed procedures. During this entire length of time I was immediately available to the patient. Dahlia Aguillon, DO Disposition Admitted to Dr. Tavares Virk Diagnosis Clinical impression: 1. COVID-19   
2. Acute respiratory failure with hypoxia ([address] 3. Hypernatremia Attestation: 
Please note that this dictation was completed with [employer], the [employer] voice recognition software.  Quite often unanticipated grammatical, syntax, homophones, and other interpretive errors are inadvertently transcribed by the computer software. Please disregard these errors. Please excuse any errors that have escaped final proofreading. Thank you.  
Noelle Dakin, DO

## 2020-11-30 NOTE — ED TRIAGE NOTES
EMS dispatched for hypoxia and hallicunations. COVID + pt from [facility].   Hx dementia and HTN

## 2020-11-30 NOTE — H&P
History and Physical 
 
NAME: Shaheed Vicente :  10/17/1926 MRN:  [id number] Date/Time:  2020 5:11 PM 
 
Patient PCP: Jettie Gilford, MD 
______________________________________________________________________ Subjective: CHIEF COMPLAINT:  
 
Altered mental status HISTORY OF PRESENT ILLNESS:    
 
Patient is a 80y.o. year old female with history of dementia patient from [address] [place] [address] home with shortness of breath patient was recently positive for Covid 19 this morning patient having difficulty in breathing and hypoxemic on room air sent to the ER further evaluation and treatment Work-up shows elevated sodium and UA positive for UTI Past Medical History:  
Diagnosis Date  Dementia ([facility] [address]  DNR (do not resuscitate)  Hypertension No past surgical history on file. Social History Tobacco Use  Smoking status: Not on file Substance Use Topics  Alcohol use: Not on file No family history on file. No Known Allergies Prior to Admission medications Medication Sig Start Date End Date Taking? Authorizing Provider  
azithromycin (Zithromax Z-Ye) 250 mg tablet Take 1 Tab by mouth See Admin Instructions for 6 days. Follow instructions on package 20  Yulissa Hinton PA-C  
levoFLOXacin (Levaquin) 750 mg tablet Take 1 Tab by mouth daily. 20   aJimie Sargent PA-C  
dorzolamide-timolol, PF, 2-0.5 % drop Apply  to eye. Provider, Historical  
latanoprost (XALATAN) 0.005 % ophthalmic solution Administer 1 Drop to both eyes nightly. Provider, Historical  
 
 
 
Current Facility-Administered Medications:  
  dextrose 5 % - 0.45% NaCl infusion, 75 mL/hr, IntraVENous, CONTINUOUS, Anand Zambrano MD, Last Rate: 75 mL/hr at 20 1426, 75 mL/hr at 20 1426   . PHARMACY TO SUBSTITUTE PER PROTOCOL (Reordered from: dorzolamide-timolol, PF, 2-0.5 % drop), , , Per Protocol, Jeny Zambrano MD 
  latanoprost (XALATAN) 0.005 % ophthalmic solution 1 Drop, 1 Drop, Both Eyes, QHS, Josette Zambrano MD 
  azithromycin [facility]) tablet 500 mg, 500 mg, Oral, See Admin Instructions, Josette Zambrano MD 
  0.9% sodium chloride infusion, 75 mL/hr, IntraVENous, CONTINUOUS, Josette Zambrano MD 
  acetaminophen (TYLENOL) tablet 650 mg, 650 mg, Oral, Q6H PRN **OR** acetaminophen (TYLENOL) suppository 650 mg, 650 mg, Rectal, Q6H PRN, Josette Zambrano MD 
  polyethylene glycol (MIRALAX) packet 17 g, 17 g, Oral, DAILY PRN, Josette Zambrano MD 
  ondansetron (ZOFRAN ODT) tablet 4 mg, 4 mg, Oral, Q8H PRN **OR** ondansetron (ZOFRAN) injection 4 mg, 4 mg, IntraVENous, Q6H PRN, Anand Zambrano MD 
  heparin (porcine) injection 5,000 Units, 5,000 Units, SubCUTAneous, Q8H, Anand Zambrano MD 
  cefTRIAXone (ROCEPHIN) 1 g in 0.9% sodium chloride (MBP/ADV) 50 mL MBP, 1 g, IntraVENous, Q24H, Anand Zambrano MD 
  dexamethasone (DECADRON) 4 mg/mL injection 4 mg, 4 mg, IntraVENous, Q6H, Anand Zambrano MD 
 
Current Outpatient Medications:  
  azithromycin (Zithromax Z-Ye) 250 mg tablet, Take 1 Tab by mouth See Admin Instructions for 6 days. Follow instructions on package, Disp: 6 Tab, Rfl: 0 
  levoFLOXacin (Levaquin) 750 mg tablet, Take 1 Tab by mouth daily. , Disp: 5 Tab, Rfl: 0 
  dorzolamide-timolol, PF, 2-0.5 % drop, Apply  to eye., Disp: , Rfl:  
  latanoprost (XALATAN) 0.005 % ophthalmic solution, Administer 1 Drop to both eyes nightly., Disp: , Rfl:  
 
LAB DATA REVIEWED:   
Recent Results (from the past 24 hour(s)) CBC WITH AUTOMATED DIFF Collection Time: 11/30/20 10:00 AM  
Result Value Ref Range WBC 11.9 (H) 3.6 - 11.0 K/uL  
 RBC 4.54 3.80 - 5.20 M/uL  
 HGB 14.0 11.5 - 16.0 g/dL HCT 43.9 35.0 - 47.0 % MCV 96.7 80.0 - 99.0 FL  
 MCH 30.8 26.0 - 34.0 PG  
 MCHC 31.9 30.0 - 36.5 g/dL  
 RDW 14.7 (H) 11.5 - 14.5 % PLATELET 743 192 - 185 K/uL  MPV 10.7 8.9 - 12.9 FL  
 NEUTROPHILS 83 (H) 32 - 75 %  
 LYMPHOCYTES 8 (L) 12 - 49 % MONOCYTES 8 5 - 13 % EOSINOPHILS 0 0 - 7 % BASOPHILS 0 0 - 1 % IMMATURE GRANULOCYTES 1 (H) 0.0 - 0.5 % ABS. NEUTROPHILS 10.1 (H) 1.8 - 8.0 K/UL  
 ABS. LYMPHOCYTES 0.9 0.8 - 3.5 K/UL  
 ABS. MONOCYTES 0.9 0.0 - 1.0 K/UL  
 ABS. EOSINOPHILS 0.0 0.0 - 0.4 K/UL  
 ABS. BASOPHILS 0.0 0.0 - 0.1 K/UL  
 ABS. IMM. GRANS. 0.1 (H) 0.00 - 0.04 K/UL  
 DF AUTOMATED METABOLIC PANEL, COMPREHENSIVE Collection Time: 11/30/20 10:00 AM  
Result Value Ref Range Sodium 159 (H) 136 - 145 mmol/L Potassium 2.9 (L) 3.5 - 5.1 mmol/L Chloride 122 (H) 97 - 108 mmol/L  
 CO2 29 21 - 32 mmol/L Anion gap 8 5 - 15 mmol/L Glucose 110 (H) 65 - 100 mg/dL BUN 50 (H) 6 - 20 mg/dL Creatinine 1.30 (H) 0.55 - 1.02 mg/dL BUN/Creatinine ratio 38 (H) 12 - 20 GFR est AA 46 (L) >60 ml/min/1.73m2 GFR est non-AA 38 (L) >60 ml/min/1.73m2 Calcium 9.7 8.5 - 10.1 mg/dL Bilirubin, total 0.4 0.2 - 1.0 mg/dL AST (SGOT) 26 15 - 37 U/L  
 ALT (SGPT) 14 12 - 78 U/L Alk. phosphatase 114 45 - 117 U/L Protein, total 8.7 (H) 6.4 - 8.2 g/dL Albumin 3.0 (L) 3.5 - 5.0 g/dL Globulin 5.7 (H) 2.0 - 4.0 g/dL A-G Ratio 0.5 (L) 1.1 - 2.2 BNP Collection Time: 11/30/20 10:00 AM  
Result Value Ref Range NT pro- (H) <450 pg/mL LACTIC ACID Collection Time: 11/30/20 10:00 AM  
Result Value Ref Range Lactic acid 2.5 (HH) 0.4 - 2.0 mmol/L  
TROPONIN I Collection Time: 11/30/20 10:00 AM  
Result Value Ref Range Troponin-I, Qt. <0.05 <0.05 ng/mL URINALYSIS W/ REFLEX CULTURE Collection Time: 11/30/20 11:40 AM  
 Specimen: Urine Result Value Ref Range Color Yellow/Straw Appearance Turbid (A) Clear Specific gravity 1.012 1.003 - 1.030    
 pH (UA) 5.0 5.0 - 8.0 Protein 100 (A) Negative mg/dL Glucose Negative Negative mg/dL Ketone 20 (A) Negative mg/dL Bilirubin Negative Negative Blood Negative Negative Urobilinogen 0.1 0.1 - 1.0 EU/dL Nitrites Negative Negative Leukocyte Esterase Large (A) Negative WBC >100 (H) 0 - 4 /hpf  
 RBC 0-5 0 - 5 /hpf Bacteria Negative Negative /hpf  
 UA:UC IF INDICATED Urine Culture Ordered (A) Culture not indicated by UA result Hyaline cast 2-5 0 - 5 /lpf INFLUENZA A & B AG (RAPID TEST) Collection Time: 11/30/20  1:00 PM  
Result Value Ref Range Influenza A Antigen Negative Negative Influenza B Antigen Negative Negative XR Results (most recent): 
Results from Hospital Encounter encounter on 11/30/20 XR CHEST SNGL V  
 Narrative Dyspnea. Comparison chest x-ray 11/26/2020. Impression FINDINGS: Impression: Frontal single view chest. Obscuring overlying cardiac 
leads. Normal heart size. Calcified aorta. No vascular congestion. The lungs are somewhat hyperinflated, likely from air trapping. Unchanged small 
opacity overlying the right fifth rib. Calcified biapical pleural-parenchymal 
thickening unchanged. No consolidation, effusion, pneumothorax. Bony demineralization. Thoracic and lumbar vertebral body compressions grossly 
similar to previous. Bilateral rib deformities similar to previous. No free air under the diaphragm. XR CHEST SNGL V Final Result FINDINGS: Impression: Frontal single view chest. Obscuring overlying cardiac  
leads. Normal heart size. Calcified aorta. No vascular congestion. The lungs are somewhat hyperinflated, likely from air trapping. Unchanged small  
opacity overlying the right fifth rib. Calcified biapical pleural-parenchymal  
thickening unchanged. No consolidation, effusion, pneumothorax. Bony demineralization. Thoracic and lumbar vertebral body compressions grossly  
similar to previous. Bilateral rib deformities similar to previous. No free air under the diaphragm. Review of Systems: 
Unable to obtain Objective: VITALS:   
Visit Vitals /67 Pulse 68 Resp 16 Ht 5' 1\" (1.549 m) Wt 45.4 kg (100 lb) SpO2 94% BMI 18.89 kg/m² Physical Exam:  
Constitutional: Awake nonverbal  
HENT:  
Head: Normocephalic and atraumatic. Eyes: Pupils are equal, round, and reactive to light. EOM are normal.  
Cardiovascular: Normal rate, regular rhythm and normal heart sounds. Pulmonary/Chest: Breath sounds normal. No wheezes. No rales. Exhibits no tenderness. Abdominal: Soft. Bowel sounds are normal. There is no abdominal tenderness. There is no rebound and no guarding. Musculoskeletal: Normal range of motion. Neurological: Not responding ASSESSMENT & PLAN: 
 
Metabolic encephalopathy COVID-19 positive Advanced dementia Hypernatremia UTI Hypokalemia Acute kidney injury Lactic acidosis Current Facility-Administered Medications:  
  dextrose 5 % - 0.45% NaCl infusion, 75 mL/hr, IntraVENous, CONTINUOUS, Anand Zambrano MD, Last Rate: 75 mL/hr at 11/30/20 1426, 75 mL/hr at 11/30/20 1426   . PHARMACY TO SUBSTITUTE PER PROTOCOL (Reordered from: dorzolamide-timolol, PF, 2-0.5 % drop), , , Per Protocol, Deniz Zambrano MD 
  latanoprost (XALATAN) 0.005 % ophthalmic solution 1 Drop, 1 Drop, Both Eyes, QHS, Deniz Zambrano MD 
  [facility]) tablet 500 mg, 500 mg, Oral, See Admin Instructions, Deniz Zambrano MD 
  0.9% sodium chloride infusion, 75 mL/hr, IntraVENous, CONTINUOUS, Deniz Zambrano MD 
  acetaminophen (TYLENOL) tablet 650 mg, 650 mg, Oral, Q6H PRN **OR** acetaminophen (TYLENOL) suppository 650 mg, 650 mg, Rectal, Q6H PRN, Deniz Zambrano MD 
  polyethylene glycol (MIRALAX) packet 17 g, 17 g, Oral, DAILY PRN, Deniz Zambrano MD 
  ondansetron (ZOFRAN ODT) tablet 4 mg, 4 mg, Oral, Q8H PRN **OR** ondansetron (ZOFRAN) injection 4 mg, 4 mg, IntraVENous, Q6H PRN, Anand Zambrano MD 
  heparin (porcine) injection 5,000 Units, 5,000 Units, SubCUTAneous, Q8H, Deniz Zambrano MD 
  cefTRIAXone (ROCEPHIN) 1 g in 0.9% sodium chloride (MBP/ADV) 50 mL MBP, 1 g, IntraVENous, Q24H, Anand Zambrano MD 
  dexamethasone (DECADRON) 4 mg/mL injection 4 mg, 4 mg, IntraVENous, Q6H, Anand Zambrano MD 
 
Current Outpatient Medications:  
  azithromycin (Zithromax Z-Ey) 250 mg tablet, Take 1 Tab by mouth See Admin Instructions for 6 days. Follow instructions on package, Disp: 6 Tab, Rfl: 0 
  levoFLOXacin (Levaquin) 750 mg tablet, Take 1 Tab by mouth daily. , Disp: 5 Tab, Rfl: 0 
  dorzolamide-timolol, PF, 2-0.5 % drop, Apply  to eye., Disp: , Rfl:  
  latanoprost (XALATAN) 0.005 % ophthalmic solution, Administer 1 Drop to both eyes nightly., Disp: , Rfl:  
 
Patient admitted to medical telemetry floor start on Zithromax and Rocephin start on Yanely drawn Heparin subcu ID consult and nephrology consult Replace potassium monitor sodium level 
________________________________________________________________________ Signed: Clarissa Cleary MD

## 2020-12-01 NOTE — CONSULTS
Consult Date: 12/1/2020 Consults:  Covid-19 Subjective This is a 80year old female with dementia, brought to the ED by EMS because of worsening SOB and hypoxia. She is known Covid-19 positive, living at the [facility]. She was ?afebrile, tachycardic and tachypneic. WBC was elevated at 11,900 and elevated lactic acid. Urinalysis showed marked pyuria but no bacteria. Influenza A/B antigen negative. On CXR, the lungs are somewhat hyperinflated, possibly from air trapping (reviewed by me, see below). Blood and urine cultures ordered. Patient started on Azithromycin, Ceftriaxone, Decadron. ID has been consulted for this reason. She is currently on no supplemental oxygen. Patient is verbally unresponsive but appears to be resting comfortably. Past Medical History:  
Diagnosis Date  Dementia ([facility] [address]  DNR (do not resuscitate)  Hypertension No past surgical history on file. No family history on file. Social History Tobacco Use  Smoking status: Not on file Substance Use Topics  Alcohol use: Not on file Current Facility-Administered Medications Medication Dose Route Frequency Provider Last Rate Last Dose  dextrose 5% infusion  75 mL/hr IntraVENous CONTINUOUS Jan Gutierrez MD 75 mL/hr at 12/01/20 1520 75 mL/hr at 12/01/20 1520  dorzolamide (TRUSOPT) 2 % ophthalmic solution 1 Drop  1 Drop Ophthalmic BID Anand Zambrano MD   1 Drop at 12/01/20 0238  latanoprost (XALATAN) 0.005 % ophthalmic solution 1 Drop  1 Drop Both Eyes QHS Anand Zambrano MD   1 Drop at 11/30/20 2330  
 azithromycin (ZITHROMAX) tablet 500 mg  500 mg Oral See Admin Instructions Alex Zambrano MD      
[facility] acetaminophen (TYLENOL) tablet 650 mg  650 mg Oral Q6H PRN Alex Zambrano MD      
 Or  
[facility] acetaminophen (TYLENOL) suppository 650 mg  650 mg Rectal Q6H PRN Alex Zambrano MD      
 polyethylene glycol (MIRALAX) packet 17 g  17 g Oral DAILY PRN Alex Zambrano MD      
[facility] ondansetron (ZOFRAN ODT) tablet 4 mg  4 mg Oral Q8H PRN Ariela Zambrano MD      
 Or  
 ondansetron [facility]) injection 4 mg  4 mg IntraVENous Q6H PRN Ariela Zambrano MD      
 heparin, porcine (PF) 5,000 unit/0.5 mL injection 5,000 Units  5,000 Units SubCUTAneous Q8H Anand Zambrano MD   5,000 Units at 12/01/20 1520  cefTRIAXone (ROCEPHIN) 1 g in 0.9% sodium chloride (MBP/ADV) 50 mL MBP  1 g IntraVENous Q24H Anand Zambrano  mL/hr at 11/30/20 1836 1 g at 11/30/20 1836  dexamethasone (DECADRON) 4 mg/mL injection 4 mg  4 mg IntraVENous Q6H Anand Zambrano MD   4 mg at 12/01/20 1217  timolol (TIMOPTIC) 0.5 % ophthalmic solution 1 Drop  1 Drop Both Eyes BID Anand Zambrano MD   1 Drop at 12/01/20 2058 Review of Systems Unable to perform ROS: Dementia Objective Vital signs for last 24 hours: 
Visit Vitals /79 Pulse 83 Temp 97.2 °F (36.2 °C) Resp 18 Ht 5' 1\" (1.549 m) Wt 100 lb (45.4 kg) SpO2 95% BMI 18.89 kg/m² Intake/Output this shift: 
Current Shift: No intake/output data recorded. Last 3 Shifts: No intake/output data recorded. Data Review:  
Recent Results (from the past 24 hour(s)) METABOLIC PANEL, COMPREHENSIVE Collection Time: 12/01/20  7:00 AM  
Result Value Ref Range Sodium 164 (HH) 136 - 145 mmol/L Potassium 3.7 3.5 - 5.1 mmol/L Chloride 131 (H) 97 - 108 mmol/L  
 CO2 28 21 - 32 mmol/L Anion gap 5 5 - 15 mmol/L Glucose 170 (H) 65 - 100 mg/dL BUN 34 (H) 6 - 20 mg/dL Creatinine 0.84 0.55 - 1.02 mg/dL BUN/Creatinine ratio 40 (H) 12 - 20 GFR est AA >60 >60 ml/min/1.73m2 GFR est non-AA >60 >60 ml/min/1.73m2 Calcium 8.9 8.5 - 10.1 mg/dL Bilirubin, total 0.3 0.2 - 1.0 mg/dL AST (SGOT) 28 15 - 37 U/L  
 ALT (SGPT) 15 12 - 78 U/L Alk. phosphatase 98 45 - 117 U/L Protein, total 8.0 6.4 - 8.2 g/dL Albumin 2.6 (L) 3.5 - 5.0 g/dL Globulin 5.4 (H) 2.0 - 4.0 g/dL  A-G Ratio 0.5 (L) 1.1 - 2. 2 CBC WITH AUTOMATED DIFF Collection Time: 12/01/20  7:00 AM  
Result Value Ref Range WBC 8.0 3.6 - 11.0 K/uL  
 RBC 4.69 3.80 - 5.20 M/uL  
 HGB 14.0 11.5 - 16.0 g/dL HCT 45.7 35.0 - 47.0 % MCV 97.4 80.0 - 99.0 FL  
 MCH 29.9 26.0 - 34.0 PG  
 MCHC 30.6 30.0 - 36.5 g/dL  
 RDW 14.8 (H) 11.5 - 14.5 % PLATELET 895 845 - 369 K/uL MPV 10.7 8.9 - 12.9 FL  
 NEUTROPHILS 83 (H) 32 - 75 % LYMPHOCYTES 11 (L) 12 - 49 % MONOCYTES 4 (L) 5 - 13 % EOSINOPHILS 0 0 - 7 % BASOPHILS 0 0 - 1 % IMMATURE GRANULOCYTES 2 (H) 0.0 - 0.5 % ABS. NEUTROPHILS 6.8 1.8 - 8.0 K/UL  
 ABS. LYMPHOCYTES 0.9 0.8 - 3.5 K/UL  
 ABS. MONOCYTES 0.3 0.0 - 1.0 K/UL  
 ABS. EOSINOPHILS 0.0 0.0 - 0.4 K/UL  
 ABS. BASOPHILS 0.0 0.0 - 0.1 K/UL  
 ABS. IMM. GRANS. 0.2 (H) 0.00 - 0.04 K/UL  
 DF AUTOMATED    
 
CXR (11/30) Physical Exam 
Vitals signs and nursing note reviewed. Constitutional:   
   General: She is not in acute distress. Appearance: She is ill-appearing. She is not diaphoretic. Comments: Cachectic HENT:  
   Head: Normocephalic and atraumatic. Nose:  
   Comments: Not on Nasal O2 Mouth/Throat:  
   Pharynx: Oropharynx is clear. Eyes:  
   Pupils: Pupils are equal, round, and reactive to light. Neck: Musculoskeletal: Neck supple. Cardiovascular:  
   Rate and Rhythm: Normal rate and regular rhythm. Heart sounds: No murmur. Pulmonary:  
   Breath sounds: No wheezing, rhonchi or rales. Abdominal:  
   Palpations: Abdomen is soft. Tenderness: There is no abdominal tenderness. There is no guarding. Genitourinary: 
   Comments: No Dowell catheter Musculoskeletal:  
   Right lower leg: No edema. Left lower leg: No edema. Skin: 
   Findings: No rash. Neurological:  
   Mental Status: She is disoriented. Psychiatric:  
   Comments: Unable to assess because of dementia ASSESSMENT/PLAN 1. Covid-19 infection with possible pneumonitis 2. Acute hypoxic respiratory failure, on nasal O2 3. Possible UTI with marked pyuria, culture pending 4. Sepsis with leukocytosis and elevated lactic acid 5. Dementia 1. Continue Azithromycin, Ceftriaxone, Decadron 2. Order convalescent plasma 3. Hold off on Actemra for now 4. In am, repeat lactic acid, check CRP, procalcitonin, LDH, ferritin 5. Follow-up blood and urine cultures Nael Stearns MD

## 2020-12-01 NOTE — PROGRESS NOTES
Two person skin assessment done with TRICIA Aguillon. Generalized skin tags noted. Knees pink and scattered scratches to fingers.

## 2020-12-01 NOTE — CONSULTS
Consult Date: 12/1/2020 IP CONSULT TO NEPHROLOGY Consult performed by: Abdi Fairchild MD 
Consult ordered by: Heena Villeda MD 
 
 
  hypernatremia and acute kidney injury Subjective / History of presenting illness: 
 
Patient was seen and examined. She is a 70-year-old  lady with history of dementia, generalized weakness, hypertension who was noted to be positive for COVID-19 at [address] [place] [address] along with hypoxia and dyspnea. She was initially admitted on 1130 and found to have abnormal sodium with low potassium and hence were consulted. Patient is confused or too weak to answer any questions. Past Medical History:  
Diagnosis Date  Dementia ([facility] [address]  DNR (do not resuscitate)  Hypertension No past surgical history on file. No family history on file. Social History Tobacco Use  Smoking status: Not on file Substance Use Topics  Alcohol use: Not on file Current Facility-Administered Medications Medication Dose Route Frequency Provider Last Rate Last Dose  dextrose 5% infusion  75 mL/hr IntraVENous CONTINUOUS Derian Garcia MD      
 dorzolamide (TRUSOPT) 2 % ophthalmic solution 1 Drop  1 Drop Ophthalmic BID Anand Zambrano MD   1 Drop at 12/01/20 1484  latanoprost (XALATAN) 0.005 % ophthalmic solution 1 Drop  1 Drop Both Eyes Q Anand Zambrano MD   1 Drop at 11/30/20 2330  
 azithromycin (ZITHROMAX) tablet 500 mg  500 mg Oral See Admin Instructions Heena Villeda MD      
[address] [place] [address] acetaminophen (TYLENOL) tablet 650 mg  650 mg Oral Q6H PRN Ladi Zambrano MD      
 Or  
[address] [place] [address] acetaminophen (TYLENOL) suppository 650 mg  650 mg Rectal Q6H PRN Ladi Zambrano MD      
 polyethylene glycol (MIRALAX) packet 17 g  17 g Oral DAILY PRN Ladi Zambrano MD      
 ondansetron (ZOFRAN ODT) tablet 4 mg  4 mg Oral Q8H PRN Anand Zambrano MD      
 Or  
 ondansetron [facility]) injection 4 mg  4 mg IntraVENous Q6H PRN Heena Villeda MD      
[address] [place] [address] heparin, porcine (PF) 5,000 unit/0.5 mL injection 5,000 Units  5,000 Units SubCUTAneous Sreekanth Zaragoza MD   5,000 Units at 12/01/20 9831  cefTRIAXone (ROCEPHIN) 1 g in 0.9% sodium chloride (MBP/ADV) 50 mL MBP  1 g IntraVENous Q24H Anand Zambrano  mL/hr at 11/30/20 1836 1 g at 11/30/20 1836  dexamethasone (DECADRON) 4 mg/mL injection 4 mg  4 mg IntraVENous Q6H Anand Zambrano MD   4 mg at 12/01/20 1217  timolol (TIMOPTIC) 0.5 % ophthalmic solution 1 Drop  1 Drop Both Eyes BID Anand Zambrano MD   1 Drop at 12/01/20 2199 Review of Systems Unable to perform ROS: Dementia Objective Vital signs for last 24 hours: 
Visit Vitals BP (!) 160/80 Pulse 76 Temp 97.1 °F (36.2 °C) Resp 20 Ht 5' 1\" (1.549 m) Wt 45.4 kg (100 lb) SpO2 97% BMI 18.89 kg/m² Intake/Output this shift: 
Current Shift: No intake/output data recorded. Last 3 Shifts: No intake/output data recorded. Physical Exam  
HENT:  
Head: Normocephalic and atraumatic. Eyes: Right eye exhibits no discharge. Cardiovascular: Normal rate and normal heart sounds. Pulmonary/Chest: Effort normal and breath sounds normal.  
Abdominal: Soft. Bowel sounds are normal.  
Skin: Skin is warm and dry. Data Review:  
Recent Results (from the past 24 hour(s)) CBC WITH AUTOMATED DIFF Collection Time: 12/01/20  7:00 AM  
Result Value Ref Range WBC 8.0 3.6 - 11.0 K/uL  
 RBC 4.69 3.80 - 5.20 M/uL  
 HGB 14.0 11.5 - 16.0 g/dL HCT 45.7 35.0 - 47.0 % MCV 97.4 80.0 - 99.0 FL  
 MCH 29.9 26.0 - 34.0 PG  
 MCHC 30.6 30.0 - 36.5 g/dL  
 RDW 14.8 (H) 11.5 - 14.5 % PLATELET 262 027 - 882 K/uL MPV 10.7 8.9 - 12.9 FL  
 NEUTROPHILS 83 (H) 32 - 75 % LYMPHOCYTES 11 (L) 12 - 49 % MONOCYTES 4 (L) 5 - 13 % EOSINOPHILS 0 0 - 7 % BASOPHILS 0 0 - 1 % IMMATURE GRANULOCYTES 2 (H) 0.0 - 0.5 % ABS. NEUTROPHILS 6.8 1.8 - 8.0 K/UL  
 ABS. LYMPHOCYTES 0.9 0.8 - 3.5 K/UL  
 ABS.  MONOCYTES 0.3 0.0 - 1.0 K/UL  
 ABS. EOSINOPHILS 0.0 0.0 - 0.4 K/UL  
 ABS. BASOPHILS 0.0 0.0 - 0.1 K/UL  
 ABS. IMM. GRANS. 0.2 (H) 0.00 - 0.04 K/UL  
 DF AUTOMATED    
 
 
 
XR CHEST SNGL V Final Result FINDINGS: Impression: Frontal single view chest. Obscuring overlying cardiac  
leads. Normal heart size. Calcified aorta. No vascular congestion. The lungs are somewhat hyperinflated, likely from air trapping. Unchanged small  
opacity overlying the right fifth rib. Calcified biapical pleural-parenchymal  
thickening unchanged. No consolidation, effusion, pneumothorax. Bony demineralization. Thoracic and lumbar vertebral body compressions grossly  
similar to previous. Bilateral rib deformities similar to previous. No free air under the diaphragm. Patient Active Problem List  
Diagnosis Code  Acute respiratory failure (HCC) J96.00  
 COVID-19 U07.1 DIAGNOSES: 
1. Hyponatremia most likely chronic 2. Acute kidney injury 3. Hypokalemia 4. Encephalopathy 5. History of dementia 6. History of hypertension PLAN: 
Confirm blood test today Change IV fluids from D5 half-normal saline to D5W at 75 mils. Would suggest track BMP every 6 hours. The goal is to bring sodium down to 153 by tomorrow Correct K later Thank you for consulting me

## 2020-12-01 NOTE — PROGRESS NOTES
Patient is presently confused, hallucinating and uncooperative. Did as the sections of the admission that can be done.

## 2020-12-01 NOTE — ROUTINE PROCESS
Bedside and Verbal shift change report given to Myron Piedra (oncoming nurse) by Ronaldo Manzanares (offgoing nurse). Report included the following information SBAR and MAR.

## 2020-12-01 NOTE — PROGRESS NOTES
General Daily Progress Note Patient Name:  
Pastora Renee YOB: 1926 Age: 
80 y.o. Admit Date: 11/30/2020 Subjective:  
 
 
Patient not in any distress Objective:  
 
Visit Vitals /79 Pulse 83 Temp 97.2 °F (36.2 °C) Resp 18 Ht 5' 1\" (1.549 m) Wt 45.4 kg (100 lb) SpO2 95% BMI 18.89 kg/m² Recent Results (from the past 24 hour(s)) METABOLIC PANEL, COMPREHENSIVE Collection Time: 12/01/20  7:00 AM  
Result Value Ref Range Sodium 164 (HH) 136 - 145 mmol/L Potassium 3.7 3.5 - 5.1 mmol/L Chloride 131 (H) 97 - 108 mmol/L  
 CO2 28 21 - 32 mmol/L Anion gap 5 5 - 15 mmol/L Glucose 170 (H) 65 - 100 mg/dL BUN 34 (H) 6 - 20 mg/dL Creatinine 0.84 0.55 - 1.02 mg/dL BUN/Creatinine ratio 40 (H) 12 - 20 GFR est AA >60 >60 ml/min/1.73m2 GFR est non-AA >60 >60 ml/min/1.73m2 Calcium 8.9 8.5 - 10.1 mg/dL Bilirubin, total 0.3 0.2 - 1.0 mg/dL AST (SGOT) 28 15 - 37 U/L  
 ALT (SGPT) 15 12 - 78 U/L Alk. phosphatase 98 45 - 117 U/L Protein, total 8.0 6.4 - 8.2 g/dL Albumin 2.6 (L) 3.5 - 5.0 g/dL Globulin 5.4 (H) 2.0 - 4.0 g/dL A-G Ratio 0.5 (L) 1.1 - 2.2    
CBC WITH AUTOMATED DIFF Collection Time: 12/01/20  7:00 AM  
Result Value Ref Range WBC 8.0 3.6 - 11.0 K/uL  
 RBC 4.69 3.80 - 5.20 M/uL  
 HGB 14.0 11.5 - 16.0 g/dL HCT 45.7 35.0 - 47.0 % MCV 97.4 80.0 - 99.0 FL  
 MCH 29.9 26.0 - 34.0 PG  
 MCHC 30.6 30.0 - 36.5 g/dL  
 RDW 14.8 (H) 11.5 - 14.5 % PLATELET 548 432 - 494 K/uL MPV 10.7 8.9 - 12.9 FL  
 NEUTROPHILS 83 (H) 32 - 75 % LYMPHOCYTES 11 (L) 12 - 49 % MONOCYTES 4 (L) 5 - 13 % EOSINOPHILS 0 0 - 7 % BASOPHILS 0 0 - 1 % IMMATURE GRANULOCYTES 2 (H) 0.0 - 0.5 % ABS. NEUTROPHILS 6.8 1.8 - 8.0 K/UL  
 ABS. LYMPHOCYTES 0.9 0.8 - 3.5 K/UL  
 ABS. MONOCYTES 0.3 0.0 - 1.0 K/UL  
 ABS. EOSINOPHILS 0.0 0.0 - 0.4 K/UL  
 ABS. BASOPHILS 0.0 0.0 - 0.1 K/UL  
 ABS. IMM. GRANS. 0.2 (H) 0.00 - 0.04 K/UL  
 DF AUTOMATED    
 
[unfilled] Review of Systems Unable  to obtain. Physical Exam:   
 
Constitutional: pt is oriented to person, place, and time. HENT:  
Head: Normocephalic and atraumatic. Eyes: Pupils are equal, round, and reactive to light. EOM are normal.  
Cardiovascular: Normal rate, regular rhythm and normal heart sounds. Pulmonary/Chest: Breath sounds normal. No wheezes. No rales. Exhibits no tenderness. Abdominal: Soft. Bowel sounds are normal. There is no abdominal tenderness. There is no rebound and no guarding. Musculoskeletal: Normal range of motion. Neurological: pt is alert and oriented to person, place, and time. XR CHEST SNGL V Final Result FINDINGS: Impression: Frontal single view chest. Obscuring overlying cardiac  
leads. Normal heart size. Calcified aorta. No vascular congestion. The lungs are somewhat hyperinflated, likely from air trapping. Unchanged small  
opacity overlying the right fifth rib. Calcified biapical pleural-parenchymal  
thickening unchanged. No consolidation, effusion, pneumothorax. Bony demineralization. Thoracic and lumbar vertebral body compressions grossly  
similar to previous. Bilateral rib deformities similar to previous. No free air under the diaphragm. Recent Results (from the past 24 hour(s)) METABOLIC PANEL, COMPREHENSIVE Collection Time: 12/01/20  7:00 AM  
Result Value Ref Range Sodium 164 (HH) 136 - 145 mmol/L Potassium 3.7 3.5 - 5.1 mmol/L Chloride 131 (H) 97 - 108 mmol/L  
 CO2 28 21 - 32 mmol/L Anion gap 5 5 - 15 mmol/L Glucose 170 (H) 65 - 100 mg/dL BUN 34 (H) 6 - 20 mg/dL Creatinine 0.84 0.55 - 1.02 mg/dL BUN/Creatinine ratio 40 (H) 12 - 20 GFR est AA >60 >60 ml/min/1.73m2 GFR est non-AA >60 >60 ml/min/1.73m2 Calcium 8.9 8.5 - 10.1 mg/dL Bilirubin, total 0.3 0.2 - 1.0 mg/dL  AST (SGOT) 28 15 - 37 U/L  
 ALT (SGPT) 15 12 - 78 U/L Alk. phosphatase 98 45 - 117 U/L Protein, total 8.0 6.4 - 8.2 g/dL Albumin 2.6 (L) 3.5 - 5.0 g/dL Globulin 5.4 (H) 2.0 - 4.0 g/dL A-G Ratio 0.5 (L) 1.1 - 2.2    
CBC WITH AUTOMATED DIFF Collection Time: 12/01/20  7:00 AM  
Result Value Ref Range WBC 8.0 3.6 - 11.0 K/uL  
 RBC 4.69 3.80 - 5.20 M/uL  
 HGB 14.0 11.5 - 16.0 g/dL HCT 45.7 35.0 - 47.0 % MCV 97.4 80.0 - 99.0 FL  
 MCH 29.9 26.0 - 34.0 PG  
 MCHC 30.6 30.0 - 36.5 g/dL  
 RDW 14.8 (H) 11.5 - 14.5 % PLATELET 012 000 - 552 K/uL MPV 10.7 8.9 - 12.9 FL  
 NEUTROPHILS 83 (H) 32 - 75 % LYMPHOCYTES 11 (L) 12 - 49 % MONOCYTES 4 (L) 5 - 13 % EOSINOPHILS 0 0 - 7 % BASOPHILS 0 0 - 1 % IMMATURE GRANULOCYTES 2 (H) 0.0 - 0.5 % ABS. NEUTROPHILS 6.8 1.8 - 8.0 K/UL  
 ABS. LYMPHOCYTES 0.9 0.8 - 3.5 K/UL  
 ABS. MONOCYTES 0.3 0.0 - 1.0 K/UL  
 ABS. EOSINOPHILS 0.0 0.0 - 0.4 K/UL  
 ABS. BASOPHILS 0.0 0.0 - 0.1 K/UL  
 ABS. IMM. GRANS. 0.2 (H) 0.00 - 0.04 K/UL  
 DF AUTOMATED Results Procedure Component Value Units Date/Time INFLUENZA A & B AG (RAPID TEST) [[id number]] Collected:  11/30/20 1300 Order Status:  Completed Specimen:  Nasopharyngeal from Nasal washing Updated:  11/30/20 1320 Influenza A Antigen Negative Influenza B Antigen Negative CULTURE, URINE [[id number]] Collected:  11/30/20 1140 Order Status:  Completed Specimen:  Urine Updated:  11/30/20 1314 INFLUENZA A & B AG (RAPID TEST) [[id number]] Collected:  11/30/20 1045 Order Status:  No result Specimen:  Nasal washing INFLUENZA A & B AG (RAPID TEST) [[id number]] Collected:  11/30/20 1000 Order Status:  Canceled Specimen:  Nasopharyngeal from Nasal washing CULTURE, BLOOD, PAIRED [[id number]] Collected:  11/26/20 1640 Order Status:  Completed Specimen:  Blood Updated:  12/01/20 0715 Special Requests: No Special Requests Culture result: No growth 4 days Labs: Recent Labs 12/01/20 
0700 11/30/20 
1000 WBC 8.0 11.9* HGB 14.0 14.0  
HCT 45.7 43.9  379 Recent Labs 12/01/20 
0700 11/30/20 
1000 * 159*  
K 3.7 2.9*  
* 122* CO2 28 29 BUN 34* 50* CREA 0.84 1.30* * 110* CA 8.9 9.7 Recent Labs 12/01/20 
0700 11/30/20 
1000 ALT 15 14 AP 98 114 TBILI 0.3 0.4 TP 8.0 8.7* ALB 2.6* 3.0*  
GLOB 5.4* 5.7* No results for input(s): INR, PTP, APTT, INREXT in the last 72 hours. No results for input(s): FE, TIBC, PSAT, FERR in the last 72 hours. No results found for: FOL, RBCF No results for input(s): PH, PCO2, PO2 in the last 72 hours. Recent Labs 11/30/20 
1000 TROIQ <0.05 No results found for: CHOL, CHOLX, CHLST, CHOLV, HDL, HDLP, LDL, LDLC, DLDLP, TGLX, TRIGL, TRIGP, CHHD, CHHDX No results found for: Dilshad Huynh Lab Results Component Value Date/Time Color Yellow/Straw 11/30/2020 11:40 AM  
 Appearance Turbid (A) 11/30/2020 11:40 AM  
 Specific gravity 1.012 11/30/2020 11:40 AM  
 pH (UA) 5.0 11/30/2020 11:40 AM  
 Protein 100 (A) 11/30/2020 11:40 AM  
 Glucose Negative 11/30/2020 11:40 AM  
 Ketone 20 (A) 11/30/2020 11:40 AM  
 Bilirubin Negative 11/30/2020 11:40 AM  
 Urobilinogen 0.1 11/30/2020 11:40 AM  
 Nitrites Negative 11/30/2020 11:40 AM  
 Leukocyte Esterase Large (A) 11/30/2020 11:40 AM  
 Bacteria Negative 11/30/2020 11:40 AM  
 WBC >100 (H) 11/30/2020 11:40 AM  
 RBC 0-5 11/30/2020 11:40 AM  
 
   
Assessment:  
 
COVID-19 positive Advanced dementia Hypernatremia UTI Hypokalemia Acute kidney injury Lactic acidosis Plan:  
 
Patient on IV Zithromax and Rocephin On IV Decadron IV fluid changed to D5 75 cc/h Overall prognosis poor Current Facility-Administered Medications:  
  dextrose 5% infusion, 75 mL/hr, IntraVENous, CONTINUOUS, Leigha Garcia MD, Last Rate: 75 mL/hr at 12/01/20 1520, 75 mL/hr at 12/01/20 1520 
  0.9% sodium chloride infusion 250 mL, 250 mL, IntraVENous, PRN, Keturah Anderson MD 
  dorzolamide (TRUSOPT) 2 % ophthalmic solution 1 Drop, 1 Drop, Ophthalmic, BID, Shani Zambrano MD, 1 Drop at 12/01/20 6237   latanoprost (XALATAN) 0.005 % ophthalmic solution 1 Drop, 1 Drop, Both Eyes, QHS, Anand Zabmrano MD, 1 Drop at 11/30/20 2330 
  azithromycin (ZITHROMAX) tablet 500 mg, 500 mg, Oral, See Admin Instructions, Shani Zambrano MD 
  acetaminophen (TYLENOL) tablet 650 mg, 650 mg, Oral, Q6H PRN **OR** acetaminophen (TYLENOL) suppository 650 mg, 650 mg, Rectal, Q6H PRN, Shani Zambrano MD 
  polyethylene glycol (MIRALAX) packet 17 g, 17 g, Oral, DAILY PRN, Shani Zambrano MD 
  ondansetron (ZOFRAN ODT) tablet 4 mg, 4 mg, Oral, Q8H PRN **OR** ondansetron (ZOFRAN) injection 4 mg, 4 mg, IntraVENous, Q6H PRN, Anand Zambrano MD 
  heparin, porcine (PF) 5,000 unit/0.5 mL injection 5,000 Units, 5,000 Units, SubCUTAneous, Q8H, Anand Zambrano MD, 5,000 Units at 12/01/20 1520   cefTRIAXone (ROCEPHIN) 1 g in 0.9% sodium chloride (MBP/ADV) 50 mL MBP, 1 g, IntraVENous, Q24H, Anand Zambrano MD, Last Rate: 100 mL/hr at 11/30/20 1836, 1 g at 11/30/20 1836   dexamethasone (DECADRON) 4 mg/mL injection 4 mg, 4 mg, IntraVENous, Q6H, Anand Zambrano MD, 4 mg at 12/01/20 1217   timolol (TIMOPTIC) 0.5 % ophthalmic solution 1 Drop, 1 Drop, Both Eyes, BID, Anand Zambrano MD, 1 Drop at 12/01/20 1100

## 2020-12-02 NOTE — PROGRESS NOTES
Progress Note    Patient: Leroy Sepulveda MRN: [id number]  SSN: [id number]    YOB: 1926  Age: 80 y.o. Sex: female      Admit Date: 11/30/2020    LOS: 2 days     Subjective:   Patient followed for sepsis with Covid-19  pneumonia with respiratory failure and possible UTI. Patient appears somewhat delirious today. Resting comfortably but now on nasal O2. Objective:     Vitals:    12/01/20 1520 12/01/20 1600 12/01/20 2036 12/02/20 0558   BP: 129/79  139/77 124/69   Pulse: 83 83 78 76   Resp: 18   20   Temp: 97.2 °F (36.2 °C)  (!) 96.3 °F (35.7 °C) 97.3 °F (36.3 °C)   SpO2: 95%  100% 100%   Weight:       Height:            Intake and Output:  Current Shift: No intake/output data recorded. Last three shifts: No intake/output data recorded. Physical Exam:    Vitals signs and nursing note reviewed. Constitutional:       General: She is not in acute distress. Appearance: She is ill-appearing. She is not diaphoretic. Comments: Cachectic    HENT:      Head: Normocephalic and atraumatic. Nose:      Comments: Nasal O2 at 3L/min     Mouth/Throat:      Pharynx: Oropharynx is clear. Eyes:      Pupils: Pupils are equal, round, and reactive to light. Neck:      Musculoskeletal: Neck supple. Cardiovascular:      Rate and Rhythm: Normal rate and regular rhythm. Heart sounds: No murmur. Pulmonary:      Breath sounds: No wheezing, rhonchi or rales. Abdominal:      Palpations: Abdomen is soft. Tenderness: There is no abdominal tenderness. There is no guarding. Genitourinary:     Comments: No Dowell catheter  Musculoskeletal:      Right lower leg: No edema. Left lower leg: No edema. Skin:     Findings: No rash. Neurological:      Mental Status: She is disoriented.    Psychiatric:      Comments: Unable to assess because of dementia      Lab/Data Review:     WBC 14,400    Ferritin Pending    Procalciton <0.05  CRP 3.68    Blood cultures (11/26)  No growth at 5 days  Urine culture (11/30)  Pending  Assessment:     Active Problems:    Acute respiratory failure ([address] (11/30/2020)      COVID-19 (11/30/2020)      1. Covid-19 infection with possible pneumonitis  2. Acute hypoxic respiratory failure, on nasal O2  3. Possible UTI with marked pyuria, culture pending  4. Sepsis with leukocytosis and elevated lactic acid  5. Elevated LDH and CRP, possibly secondary to #1  6. Dementia      Plan:   1. Continue Azithromycin, Ceftriaxone, Decadron  2. Start Remdesivir IV  3. Convalescent plasma  4. Hold off on Actemra for now  5. In am, repeat CRP, LDH, ferritin  6.  Follow-up blood and urine cultures       Signed By: Tamiko Bush MD     December 2, 2020

## 2020-12-02 NOTE — PROGRESS NOTES
12/2/2020 5:50 PM    Admit Date: 11/30/2020    Admit Diagnosis: Acute respiratory failure (HCC) [J96.00]  COVID-19 [U07.1]      Subjective:   No change in clinical condidtion  Review of Systems - cant be done    Objective:      Visit Vitals  /69 (BP 1 Location: Left arm, BP Patient Position: At rest)   Pulse 76   Temp 97.3 °F (36.3 °C)   Resp 20   Ht 5' 1\" (1.549 m)   Wt 45.4 kg (100 lb)   SpO2 100%   BMI 18.89 kg/m²       Physical Exam:  Defer to PMD and ID specialist exam  No intake or output data in the 24 hours ending 12/02/20 1751  Recent Results (from the past 24 hour(s))   TYPE & SCREEN    Collection Time: 12/02/20 12:20 AM   Result Value Ref Range    Crossmatch Expiration 12/05/2020,2359     ABO/Rh(D) B Positive     Antibody screen Negative    C REACTIVE PROTEIN, QT    Collection Time: 12/02/20  8:45 AM   Result Value Ref Range    C-Reactive protein 3.68 (H) 0.00 - 0.60 mg/dL   PROCALCITONIN    Collection Time: 12/02/20  8:45 AM   Result Value Ref Range    Procalcitonin <0.05 (H) 0 ng/mL   LD    Collection Time: 12/02/20  8:45 AM   Result Value Ref Range     (H) 81 - 246 U/L   LACTIC ACID    Collection Time: 12/02/20  8:45 AM   Result Value Ref Range    Lactic acid 2.5 (HH) 0.4 - 2.0 mmol/L   CBC WITH AUTOMATED DIFF    Collection Time: 12/02/20  8:45 AM   Result Value Ref Range    WBC 14.4 (H) 3.6 - 11.0 K/uL    RBC 4.32 3.80 - 5.20 M/uL    HGB 13.0 11.5 - 16.0 g/dL    HCT 42.1 35.0 - 47.0 %    MCV 97.5 80.0 - 99.0 FL    MCH 30.1 26.0 - 34.0 PG    MCHC 30.9 30.0 - 36.5 g/dL    RDW 14.6 (H) 11.5 - 14.5 %    PLATELET 830 773 - 936 K/uL    MPV 10.8 8.9 - 12.9 FL    NRBC 0.2 (H) 0  WBC    ABSOLUTE NRBC 0.03 (H) 0.00 - 0.01 K/uL    NEUTROPHILS 85 (H) 32 - 75 %    LYMPHOCYTES 9 (L) 12 - 49 %    MONOCYTES 4 (L) 5 - 13 %    EOSINOPHILS 0 0 - 7 %    BASOPHILS 0 0 - 1 %    IMMATURE GRANULOCYTES 2 (H) 0.0 - 0.5 %    ABS. NEUTROPHILS 12.4 (H) 1.8 - 8.0 K/UL    ABS.  LYMPHOCYTES 1.3 0.8 - 3.5 K/UL ABS. MONOCYTES 0.6 0.0 - 1.0 K/UL    ABS. EOSINOPHILS 0.0 0.0 - 0.4 K/UL    ABS. BASOPHILS 0.0 0.0 - 0.1 K/UL    ABS. IMM. GRANS. 0.3 (H) 0.00 - 0.04 K/UL    DF AUTOMATED     METABOLIC PANEL, COMPREHENSIVE    Collection Time: 12/02/20  8:45 AM   Result Value Ref Range    Sodium 157 (H) 136 - 145 mmol/L    Potassium 4.5 3.5 - 5.1 mmol/L    Chloride 125 (H) 97 - 108 mmol/L    CO2 29 21 - 32 mmol/L    Anion gap 3 (L) 5 - 15 mmol/L    Glucose 186 (H) 65 - 100 mg/dL    BUN 41 (H) 6 - 20 mg/dL    Creatinine 0.89 0.55 - 1.02 mg/dL    BUN/Creatinine ratio 46 (H) 12 - 20      GFR est AA >60 >60 ml/min/1.73m2    GFR est non-AA 59 (L) >60 ml/min/1.73m2    Calcium 8.7 8.5 - 10.1 mg/dL    Bilirubin, total 0.2 0.2 - 1.0 mg/dL    AST (SGOT) 23 15 - 37 U/L    ALT (SGPT) 14 12 - 78 U/L    Alk. phosphatase 79 45 - 117 U/L    Protein, total 6.6 6.4 - 8.2 g/dL    Albumin 2.3 (L) 3.5 - 5.0 g/dL    Globulin 4.3 (H) 2.0 - 4.0 g/dL    A-G Ratio 0.5 (L) 1.1 - 2.2          No intake or output data in the 24 hours ending 12/02/20 1750       Data Review:   Recent Labs     12/02/20  0845   *   K 4.5   BUN 41*   CREA 0.89   WBC 14.4*   HGB 13.0   HCT 42.1        [unfilled]   Assessment/Plan:     Active Problems:    Acute respiratory failure ([facility] [address] (11/30/2020)      COVID-19 (11/30/2020)        DIAGNOSES  1. Hypernatremia  2. COVID-19 pneumonia  3. Dementia  4. CKD  5. Malnutrition   DISCUSSION AND PLAN   Continue D5W at 100 mL/h   Goal sodium tomorrow is 151   Unfortunately insensible losses cannot be estimated and hence sodium levels tomorrow would be unpredictable. However we would rather risk not lowering it too fast       This dictation was done by MIL[employer], [employer] voice recognition software. Often unanticipated grammatical, syntax, phones and other interpretive errors are inadvertently transcribed. Please excuse errors that have escaped final proofreading.

## 2020-12-02 NOTE — ROUTINE PROCESS
Bedside and Verbal shift change report given to Shayy Littlejohn (oncoming nurse) by Angi Morin (offgoing nurse). Report included the following information SBAR and MAR.

## 2020-12-02 NOTE — MANAGEMENT PLAN
CM called to complete discharge planning with GENIE Zarate on face sheet, but got no answer. CM has left a voicemail for a return call.

## 2020-12-02 NOTE — PROGRESS NOTES
General Daily Progress Note          Patient Name:   Shaheed Vicente       YOB: 1926       Age:  80 y.o. Admit Date: 11/30/2020      Subjective:       Patient not in any distress           Objective:     Visit Vitals  /69 (BP 1 Location: Left arm, BP Patient Position: At rest)   Pulse 76   Temp 97.3 °F (36.3 °C)   Resp 20   Ht 5' 1\" (1.549 m)   Wt 45.4 kg (100 lb)   SpO2 100%   BMI 18.89 kg/m²        Recent Results (from the past 24 hour(s))   TYPE & SCREEN    Collection Time: 12/02/20 12:20 AM   Result Value Ref Range    Crossmatch Expiration 12/05/2020,2359     ABO/Rh(D) B Positive     Antibody screen Negative    C REACTIVE PROTEIN, QT    Collection Time: 12/02/20  8:45 AM   Result Value Ref Range    C-Reactive protein 3.68 (H) 0.00 - 0.60 mg/dL   PROCALCITONIN    Collection Time: 12/02/20  8:45 AM   Result Value Ref Range    Procalcitonin <0.05 (H) 0 ng/mL   LD    Collection Time: 12/02/20  8:45 AM   Result Value Ref Range     (H) 81 - 246 U/L   LACTIC ACID    Collection Time: 12/02/20  8:45 AM   Result Value Ref Range    Lactic acid 2.5 (HH) 0.4 - 2.0 mmol/L   CBC WITH AUTOMATED DIFF    Collection Time: 12/02/20  8:45 AM   Result Value Ref Range    WBC 14.4 (H) 3.6 - 11.0 K/uL    RBC 4.32 3.80 - 5.20 M/uL    HGB 13.0 11.5 - 16.0 g/dL    HCT 42.1 35.0 - 47.0 %    MCV 97.5 80.0 - 99.0 FL    MCH 30.1 26.0 - 34.0 PG    MCHC 30.9 30.0 - 36.5 g/dL    RDW 14.6 (H) 11.5 - 14.5 %    PLATELET 848 790 - 538 K/uL    MPV 10.8 8.9 - 12.9 FL    NRBC 0.2 (H) 0  WBC    ABSOLUTE NRBC 0.03 (H) 0.00 - 0.01 K/uL    NEUTROPHILS 85 (H) 32 - 75 %    LYMPHOCYTES 9 (L) 12 - 49 %    MONOCYTES 4 (L) 5 - 13 %    EOSINOPHILS 0 0 - 7 %    BASOPHILS 0 0 - 1 %    IMMATURE GRANULOCYTES 2 (H) 0.0 - 0.5 %    ABS. NEUTROPHILS 12.4 (H) 1.8 - 8.0 K/UL    ABS. LYMPHOCYTES 1.3 0.8 - 3.5 K/UL    ABS. MONOCYTES 0.6 0.0 - 1.0 K/UL    ABS. EOSINOPHILS 0.0 0.0 - 0.4 K/UL    ABS. BASOPHILS 0.0 0.0 - 0.1 K/UL    ABS. IMMValencia Morse. 0.3 (H) 0.00 - 0.04 K/UL    DF AUTOMATED     METABOLIC PANEL, COMPREHENSIVE    Collection Time: 12/02/20  8:45 AM   Result Value Ref Range    Sodium 157 (H) 136 - 145 mmol/L    Potassium 4.5 3.5 - 5.1 mmol/L    Chloride 125 (H) 97 - 108 mmol/L    CO2 29 21 - 32 mmol/L    Anion gap 3 (L) 5 - 15 mmol/L    Glucose 186 (H) 65 - 100 mg/dL    BUN 41 (H) 6 - 20 mg/dL    Creatinine 0.89 0.55 - 1.02 mg/dL    BUN/Creatinine ratio 46 (H) 12 - 20      GFR est AA >60 >60 ml/min/1.73m2    GFR est non-AA 59 (L) >60 ml/min/1.73m2    Calcium 8.7 8.5 - 10.1 mg/dL    Bilirubin, total 0.2 0.2 - 1.0 mg/dL    AST (SGOT) 23 15 - 37 U/L    ALT (SGPT) 14 12 - 78 U/L    Alk. phosphatase 79 45 - 117 U/L    Protein, total 6.6 6.4 - 8.2 g/dL    Albumin 2.3 (L) 3.5 - 5.0 g/dL    Globulin 4.3 (H) 2.0 - 4.0 g/dL    A-G Ratio 0.5 (L) 1.1 - 2.2       [unfilled]      Review of Systems    Unable  to obtain. Physical Exam:      Constitutional: pt is oriented to person, place, and time. HENT:   Head: Normocephalic and atraumatic. Eyes: Pupils are equal, round, and reactive to light. EOM are normal.   Cardiovascular: Normal rate, regular rhythm and normal heart sounds. Pulmonary/Chest: Breath sounds normal. No wheezes. No rales. Exhibits no tenderness. Abdominal: Soft. Bowel sounds are normal. There is no abdominal tenderness. There is no rebound and no guarding. Musculoskeletal: Normal range of motion. Neurological: pt is alert and oriented to person, place, and time. XR CHEST SNGL V   Final Result   FINDINGS: Impression: Frontal single view chest. Obscuring overlying cardiac   leads. Normal heart size. Calcified aorta. No vascular congestion. The lungs are somewhat hyperinflated, likely from air trapping. Unchanged small   opacity overlying the right fifth rib. Calcified biapical pleural-parenchymal   thickening unchanged. No consolidation, effusion, pneumothorax. Bony demineralization.  Thoracic and lumbar vertebral body compressions grossly   similar to previous. Bilateral rib deformities similar to previous. No free air under the diaphragm. Recent Results (from the past 24 hour(s))   TYPE & SCREEN    Collection Time: 12/02/20 12:20 AM   Result Value Ref Range    Crossmatch Expiration 12/05/2020,2359     ABO/Rh(D) B Positive     Antibody screen Negative    C REACTIVE PROTEIN, QT    Collection Time: 12/02/20  8:45 AM   Result Value Ref Range    C-Reactive protein 3.68 (H) 0.00 - 0.60 mg/dL   PROCALCITONIN    Collection Time: 12/02/20  8:45 AM   Result Value Ref Range    Procalcitonin <0.05 (H) 0 ng/mL   LD    Collection Time: 12/02/20  8:45 AM   Result Value Ref Range     (H) 81 - 246 U/L   LACTIC ACID    Collection Time: 12/02/20  8:45 AM   Result Value Ref Range    Lactic acid 2.5 (HH) 0.4 - 2.0 mmol/L   CBC WITH AUTOMATED DIFF    Collection Time: 12/02/20  8:45 AM   Result Value Ref Range    WBC 14.4 (H) 3.6 - 11.0 K/uL    RBC 4.32 3.80 - 5.20 M/uL    HGB 13.0 11.5 - 16.0 g/dL    HCT 42.1 35.0 - 47.0 %    MCV 97.5 80.0 - 99.0 FL    MCH 30.1 26.0 - 34.0 PG    MCHC 30.9 30.0 - 36.5 g/dL    RDW 14.6 (H) 11.5 - 14.5 %    PLATELET 084 698 - 200 K/uL    MPV 10.8 8.9 - 12.9 FL    NRBC 0.2 (H) 0  WBC    ABSOLUTE NRBC 0.03 (H) 0.00 - 0.01 K/uL    NEUTROPHILS 85 (H) 32 - 75 %    LYMPHOCYTES 9 (L) 12 - 49 %    MONOCYTES 4 (L) 5 - 13 %    EOSINOPHILS 0 0 - 7 %    BASOPHILS 0 0 - 1 %    IMMATURE GRANULOCYTES 2 (H) 0.0 - 0.5 %    ABS. NEUTROPHILS 12.4 (H) 1.8 - 8.0 K/UL    ABS. LYMPHOCYTES 1.3 0.8 - 3.5 K/UL    ABS. MONOCYTES 0.6 0.0 - 1.0 K/UL    ABS. EOSINOPHILS 0.0 0.0 - 0.4 K/UL    ABS. BASOPHILS 0.0 0.0 - 0.1 K/UL    ABS. IMM.  GRANS. 0.3 (H) 0.00 - 0.04 K/UL    DF AUTOMATED     METABOLIC PANEL, COMPREHENSIVE    Collection Time: 12/02/20  8:45 AM   Result Value Ref Range    Sodium 157 (H) 136 - 145 mmol/L    Potassium 4.5 3.5 - 5.1 mmol/L    Chloride 125 (H) 97 - 108 mmol/L    CO2 29 21 - 32 mmol/L    Anion gap 3 (L) 5 - 15 mmol/L    Glucose 186 (H) 65 - 100 mg/dL    BUN 41 (H) 6 - 20 mg/dL    Creatinine 0.89 0.55 - 1.02 mg/dL    BUN/Creatinine ratio 46 (H) 12 - 20      GFR est AA >60 >60 ml/min/1.73m2    GFR est non-AA 59 (L) >60 ml/min/1.73m2    Calcium 8.7 8.5 - 10.1 mg/dL    Bilirubin, total 0.2 0.2 - 1.0 mg/dL    AST (SGOT) 23 15 - 37 U/L    ALT (SGPT) 14 12 - 78 U/L    Alk.  phosphatase 79 45 - 117 U/L    Protein, total 6.6 6.4 - 8.2 g/dL    Albumin 2.3 (L) 3.5 - 5.0 g/dL    Globulin 4.3 (H) 2.0 - 4.0 g/dL    A-G Ratio 0.5 (L) 1.1 - 2.2         Results     Procedure Component Value Units Date/Time    INFLUENZA A & B AG (RAPID TEST) [[id number]] Collected:  11/30/20 1300    Order Status:  Completed Specimen:  Nasopharyngeal from Nasal washing Updated:  11/30/20 1320     Influenza A Antigen Negative        Influenza B Antigen Negative       CULTURE, URINE [[id number]] Collected:  11/30/20 1140    Order Status:  Completed Specimen:  Urine Updated:  11/30/20 1314    INFLUENZA A & B AG (RAPID TEST) [[id number]] Collected:  11/30/20 1045    Order Status:  No result Specimen:  Nasal washing     INFLUENZA A & B AG (RAPID TEST) [[id number]] Collected:  11/30/20 1000    Order Status:  Canceled Specimen:  Nasopharyngeal from Nasal washing     CULTURE, BLOOD, PAIRED [[id number]] Collected:  11/26/20 1640    Order Status:  Completed Specimen:  Blood Updated:  12/02/20 0752     Special Requests: No Special Requests        Culture result: No growth 5 days              Labs:     Recent Labs     12/02/20  0845 12/01/20  0700   WBC 14.4* 8.0   HGB 13.0 14.0   HCT 42.1 45.7    340     Recent Labs     12/02/20  0845 12/01/20  0700 11/30/20  1000   * 164* 159*   K 4.5 3.7 2.9*   * 131* 122*   CO2 29 28 29   BUN 41* 34* 50*   CREA 0.89 0.84 1.30*   * 170* 110*   CA 8.7 8.9 9.7     Recent Labs     12/02/20  0845 12/01/20  0700 11/30/20  1000   ALT 14 15 14   AP 79 98 114   TBILI 0.2 0.3 0.4   TP 6.6 8.0 8.7*   ALB 2.3* 2.6* 3.0*   GLOB 4.3* 5.4* 5.7*     No results for input(s): INR, PTP, APTT, INREXT, INREXT in the last 72 hours. No results for input(s): FE, TIBC, PSAT, FERR in the last 72 hours. No results found for: FOL, RBCF   No results for input(s): PH, PCO2, PO2 in the last 72 hours.   Recent Labs     11/30/20  1000   TROIQ <0.05     No results found for: CHOL, CHOLX, CHLST, CHOLV, HDL, HDLP, LDL, LDLC, DLDLP, TGLX, TRIGL, TRIGP, CHHD, CHHDX  No results found for: GLUCPOC  Lab Results   Component Value Date/Time    Color Yellow/Straw 11/30/2020 11:40 AM    Appearance Turbid (A) 11/30/2020 11:40 AM    Specific gravity 1.012 11/30/2020 11:40 AM    pH (UA) 5.0 11/30/2020 11:40 AM    Protein 100 (A) 11/30/2020 11:40 AM    Glucose Negative 11/30/2020 11:40 AM    Ketone 20 (A) 11/30/2020 11:40 AM    Bilirubin Negative 11/30/2020 11:40 AM    Urobilinogen 0.1 11/30/2020 11:40 AM    Nitrites Negative 11/30/2020 11:40 AM    Leukocyte Esterase Large (A) 11/30/2020 11:40 AM    Bacteria Negative 11/30/2020 11:40 AM    WBC >100 (H) 11/30/2020 11:40 AM    RBC 0-5 11/30/2020 11:40 AM         Assessment:     COVID-19 positive  Advanced dementia  Hypernatremia  UTI  Hypokalemia  Acute kidney injury  Lactic acidosis      Plan:     Patient on IV Zithromax and Rocephin  On IV Decadron  Ordered convalescent plasma  IV fluid changed to D5 75 cc/h  Overall prognosis poor  Monitor sodium level and urine cultures      Current Facility-Administered Medications:     dextrose 5% infusion, 85 mL/hr, IntraVENous, CONTINUOUS, Leigha Garcia MD, Last Rate: 75 mL/hr at 12/02/20 0602, 75 mL/hr at 12/02/20 0602    dorzolamide (TRUSOPT) 2 % ophthalmic solution 1 Drop, 1 Drop, Ophthalmic, BID, Anand Zambrano MD, 1 Drop at 12/01/20 2308    latanoprost (XALATAN) 0.005 % ophthalmic solution 1 Drop, 1 Drop, Both Eyes, QHS, Anand Zambrano MD, 1 Drop at 12/01/20 2308    azithromycin (ZITHROMAX) tablet 500 mg, 500 mg, Oral, See Admin Instructions, Megan Hubbard MD    acetaminophen (TYLENOL) tablet 650 mg, 650 mg, Oral, Q6H PRN **OR** acetaminophen (TYLENOL) suppository 650 mg, 650 mg, Rectal, Q6H PRN, Chirs Zambrano MD    polyethylene glycol (MIRALAX) packet 17 g, 17 g, Oral, DAILY PRN, Chris Zambrano MD    ondansetron (ZOFRAN ODT) tablet 4 mg, 4 mg, Oral, Q8H PRN **OR** ondansetron (ZOFRAN) injection 4 mg, 4 mg, IntraVENous, Q6H PRN, Anand Zambrano MD    heparin, porcine (PF) 5,000 unit/0.5 mL injection 5,000 Units, 5,000 Units, SubCUTAneous, Q8H, Anand Zambrano MD, 5,000 Units at 12/02/20 0556    cefTRIAXone (ROCEPHIN) 1 g in 0.9% sodium chloride (MBP/ADV) 50 mL MBP, 1 g, IntraVENous, Q24H, Anand Zambrano MD, Last Rate: 100 mL/hr at 12/01/20 1736, 1 g at 12/01/20 1736    dexamethasone (DECADRON) 4 mg/mL injection 4 mg, 4 mg, IntraVENous, Q6H, Anand Zambrano MD, 4 mg at 12/02/20 0556    timolol (TIMOPTIC) 0.5 % ophthalmic solution 1 Drop, 1 Drop, Both Eyes, BID, Anand Zambrano MD, 1 Drop at 12/01/20 4707

## 2020-12-02 NOTE — MANAGEMENT PLAN
Reason for Admission: Metabolic encephalopathy and COVID 19 with shortness of breath. RUR Score: 16%                 Plan for utilizing home health:  Patient is a resident at [facility]. PCP: First and Last name:  Dr. Irma Wilcox   Name of Practice: [facility] attending physician. Are you a current patient: Yes/No: Yes   Approximate date of last visit:    Can you participate in a virtual visit with your PCP: No.                    Current Advanced Directive/Advance Care Plan: Per Arlene mcdaniel, she has POA. She requested CM request a copy from [facility]. Transition of Care Plan: CM spoke with Arlene Mcdainel and she states that patient is a resident of The [address] [place] [address] and she states that patient has most recently been using a walker and getting therapy at [facility] thought their provider due to [facility] restrictions on [facility] coming in and out. Plan is to return to The [address] [place] [address]. HEBER has called and spoken with Mansi Espitia at [facility] and she states patient can return even if she is positive. She states that she can fax over the [address] [place] [address] to [phone] which is the  fax.

## 2020-12-03 NOTE — PROGRESS NOTES
Progress Note    Patient: Kleber Spence MRN: [id number]  SSN: [id number]    YOB: 1926  Age: 80 y.o. Sex: female      Admit Date: 11/30/2020    LOS: 3 days     Subjective:   Patient followed for sepsis with Covid-19  pneumonia with respiratory failure and possible UTI. Patient is awake but confused. Offers no complaints. Remains on nasal cannula. Objective:     Vitals:    12/03/20 0543 12/03/20 0616 12/03/20 1048 12/03/20 1105   BP: (!) 140/70 133/69  130/61   Pulse: (!) 58 (!) 52  (!) 56   Resp: 18 18  18   Temp: (!) 96.2 °F (35.7 °C) (!) 96.2 °F (35.7 °C)  (!) 96.2 °F (35.7 °C)   SpO2: 100% 100% 100% 100%   Weight:       Height:            Intake and Output:  Current Shift: No intake/output data recorded. Last three shifts: No intake/output data recorded. Physical Exam:    Vitals signs and nursing note reviewed. Constitutional:       General: She is not in acute distress. Appearance: She is chronically ill-appearing. Comments: Cachectic    HENT: Nasal O2 at 3L/min     Mouth/Throat:      Pharynx: Oropharynx is clear. Eyes:      Pupils: Pupils are equal, round, and reactive to light. Neck:      Musculoskeletal: Neck supple. Cardiovascular:      Rate and Rhythm: Normal rate and regular rhythm. Heart sounds: No murmur. Pulmonary:      Breath sounds: No wheezing, rhonchi or rales. Abdominal:      Palpations: Abdomen is soft. Tenderness: There is no abdominal tenderness. There is no guarding. Genitourinary:     Comments: No Dowell catheter  Musculoskeletal:      Right lower leg: No edema. Left lower leg: No edema. Skin:     Findings: No rash. Neurological:      Mental Status: She is disoriented.    Psychiatric:      Comments: Unable to assess because of dementia      Lab/Data Review:     WBC 14,400  LDH  318  <250  Ferritin 429    Procalciton <0.05  CRP 1.68 < 3.68    Blood cultures (11/26)  No growth FINAL  Urine culture (11/30)  No growth FINAL  Assessment:     Active Problems:    Acute respiratory failure ([facility] [address] (11/30/2020)      COVID-19 (11/30/2020)      1. Covid-19 infection with possible pneumonitis, Day #3 IV Remdesivir, Azithromycin, Decadron, s/p convalescent plasma  2. Acute hypoxic respiratory failure, on nasal O2  3. No evidence of UTI with marked pyuria, culture negative  4. Sepsis with leukocytosis and elevated lactic acid and CRP  5. Elevated LDH, Ferritin and CRP, possibly secondary to #1  6. Dementia    Comment:  Leukocytosis likely secondary to steroids with decreasing CRP. Plan:   1. Continue Remdesivir, Azithromycin, Ceftriaxone, Decadron  2. Hold off on Actemra  3.  In am, repeat CRP, LDH, ferritin        Signed By: Josef Maciel MD     December 3, 2020

## 2020-12-03 NOTE — PROGRESS NOTES
14:10 Patient found on floor at foot of bed, alert, talking , confused, denies falling, unable to tell what happened. Incontinent of stool. Iv beeping oxygen off. No injuries noted. Back to bed with 2 people assisting, feet cold to touch foot mottled. Vs stable. Patient responding to simple questions. Repeating whatever is being said.

## 2020-12-03 NOTE — PROGRESS NOTES
General Daily Progress Note          Patient Name:   Adrian Mendoza       YOB: 1926       Age:  80 y.o. Admit Date: 11/30/2020      Subjective:       Patient not in any distress           Objective:     Visit Vitals  /61   Pulse (!) 56   Temp (!) 96.2 °F (35.7 °C)   Resp 18   Ht 5' 1\" (1.549 m)   Wt 45.4 kg (100 lb)   SpO2 100%   BMI 18.89 kg/m²        No results found for this or any previous visit (from the past 24 hour(s)). [unfilled]      Review of Systems    Unable  to obtain. Physical Exam:      Constitutional: pt is oriented to person, place, and time. HENT:   Head: Normocephalic and atraumatic. Eyes: Pupils are equal, round, and reactive to light. EOM are normal.   Cardiovascular: Normal rate, regular rhythm and normal heart sounds. Pulmonary/Chest: Breath sounds normal. No wheezes. No rales. Exhibits no tenderness. Abdominal: Soft. Bowel sounds are normal. There is no abdominal tenderness. There is no rebound and no guarding. Musculoskeletal: Normal range of motion. Neurological: pt is alert and oriented to person, place, and time. XR CHEST SNGL V   Final Result   FINDINGS: Impression: Frontal single view chest. Obscuring overlying cardiac   leads. Normal heart size. Calcified aorta. No vascular congestion. The lungs are somewhat hyperinflated, likely from air trapping. Unchanged small   opacity overlying the right fifth rib. Calcified biapical pleural-parenchymal   thickening unchanged. No consolidation, effusion, pneumothorax. Bony demineralization. Thoracic and lumbar vertebral body compressions grossly   similar to previous. Bilateral rib deformities similar to previous. No free air under the diaphragm. No results found for this or any previous visit (from the past 24 hour(s)).     Results     Procedure Component Value Units Date/Time    INFLUENZA A & B AG (RAPID TEST) [[id number]] Collected:  11/30/20 1300    Order Status:  Completed Specimen:  Nasopharyngeal from Nasal washing Updated:  11/30/20 1320     Influenza A Antigen Negative        Influenza B Antigen Negative       CULTURE, URINE [[id number]] Collected:  11/30/20 1140    Order Status:  Completed Specimen:  Urine Updated:  12/03/20 0827     Special Requests: --        No Special Requests  Reflexed from [id number]       Culture result: No Growth (<1000 cfu/mL)       INFLUENZA A & B AG (RAPID TEST) [[id number]] Collected:  11/30/20 1045    Order Status:  Canceled Specimen:  Nasal washing     INFLUENZA A & B AG (RAPID TEST) [[id number]] Collected:  11/30/20 1000    Order Status:  Canceled Specimen:  Nasopharyngeal from Nasal washing     CULTURE, BLOOD, PAIRED [[id number]] Collected:  11/26/20 1640    Order Status:  Completed Specimen:  Blood Updated:  12/03/20 0749     Special Requests: No Special Requests        Culture result: No growth 6 days              Labs:     Recent Labs     12/02/20  0845 12/01/20  0700   WBC 14.4* 8.0   HGB 13.0 14.0   HCT 42.1 45.7    340     Recent Labs     12/02/20  0845 12/01/20  0700   * 164*   K 4.5 3.7   * 131*   CO2 29 28   BUN 41* 34*   CREA 0.89 0.84   * 170*   CA 8.7 8.9     Recent Labs     12/02/20  0845 12/01/20  0700   ALT 14 15   AP 79 98   TBILI 0.2 0.3   TP 6.6 8.0   ALB 2.3* 2.6*   GLOB 4.3* 5.4*     No results for input(s): INR, PTP, APTT, INREXT, INREXT in the last 72 hours. Recent Labs     12/02/20  0845   FERR 429*      No results found for: FOL, RBCF   No results for input(s): PH, PCO2, PO2 in the last 72 hours. No results for input(s): CPK, CKNDX, TROIQ in the last 72 hours.     No lab exists for component: CPKMB  No results found for: CHOL, CHOLX, CHLST, CHOLV, HDL, HDLP, LDL, LDLC, DLDLP, TGLX, TRIGL, TRIGP, CHHD, CHHDX  No results found for: GLUCPOC  Lab Results   Component Value Date/Time    Color Yellow/Straw 11/30/2020 11:40 AM    Appearance Turbid (A) 11/30/2020 11:40 AM    Specific gravity 1.012 11/30/2020 11:40 AM    pH (UA) 5.0 11/30/2020 11:40 AM    Protein 100 (A) 11/30/2020 11:40 AM    Glucose Negative 11/30/2020 11:40 AM    Ketone 20 (A) 11/30/2020 11:40 AM    Bilirubin Negative 11/30/2020 11:40 AM    Urobilinogen 0.1 11/30/2020 11:40 AM    Nitrites Negative 11/30/2020 11:40 AM    Leukocyte Esterase Large (A) 11/30/2020 11:40 AM    Bacteria Negative 11/30/2020 11:40 AM    WBC >100 (H) 11/30/2020 11:40 AM    RBC 0-5 11/30/2020 11:40 AM         Assessment:     COVID-19 positive  Advanced dementia  Hypernatremia  UTI  Hypokalemia  Acute kidney injury  Lactic acidosis      Plan:     Patient on IV Zithromax and Rocephin  On IV Decadron  Ordered convalescent plasma  IV fluid changed to D5 75 cc/h  Overall prognosis poor  Monitor sodium level and urine cultures   Today's labs pending      Current Facility-Administered Medications:     heparin (porcine) injection 5,000 Units, 5,000 Units, SubCUTAneous, Q8H, Anand Zambrano MD    remdesivir 100 mg in 0.9% sodium chloride 250 mL IVPB, 100 mg, IntraVENous, Q24H, Jan Chavis MD    dextrose 5% infusion, 85 mL/hr, IntraVENous, CONTINUOUS, Lauren Garcia MD, Last Rate: 85 mL/hr at 12/02/20 1228, 85 mL/hr at 12/02/20 1228    dorzolamide (TRUSOPT) 2 % ophthalmic solution 1 Drop, 1 Drop, Ophthalmic, BID, Anand Zambrano MD, 1 Drop at 12/02/20 2100    latanoprost (XALATAN) 0.005 % ophthalmic solution 1 Drop, 1 Drop, Both Eyes, QHS, Anand Zambrano MD, 1 Drop at 12/02/20 2200    azithromycin (ZITHROMAX) tablet 500 mg, 500 mg, Oral, See Admin Instructions, Kristal Zambrano MD    acetaminophen (TYLENOL) tablet 650 mg, 650 mg, Oral, Q6H PRN **OR** acetaminophen (TYLENOL) suppository 650 mg, 650 mg, Rectal, Q6H PRN, Kristal Zambrano MD    polyethylene glycol (MIRALAX) packet 17 g, 17 g, Oral, DAILY PRN, Anand Zambrano MD    ondansetron (ZOFRAN ODT) tablet 4 mg, 4 mg, Oral, Q8H PRN **OR** ondansetron (ZOFRAN) injection 4 mg, 4 mg, IntraVENous, Q6H PRN, Mayr Zambrano MD    cefTRIAXone (ROCEPHIN) 1 g in 0.9% sodium chloride (MBP/ADV) 50 mL MBP, 1 g, IntraVENous, Q24H, Anand Zambrano MD, Last Rate: 100 mL/hr at 12/02/20 1756, 1 g at 12/02/20 1756    dexamethasone (DECADRON) 4 mg/mL injection 4 mg, 4 mg, IntraVENous, Q6H, Anadn Zambrano MD, 4 mg at 12/03/20 0500    timolol (TIMOPTIC) 0.5 % ophthalmic solution 1 Drop, 1 Drop, Both Eyes, BID, Anand Zambrano MD, 1 Drop at 12/02/20 2100

## 2020-12-03 NOTE — PROGRESS NOTES
Bedside and Verbal shift change report given to Arthur Dale RN (oncoming nurse) by Christiano Ames LPN (offgoing nurse). Report included the following information SBAR, Kardex, Intake/Output and MAR.

## 2020-12-03 NOTE — PROGRESS NOTES
12/3/2020 5:50 PM    Admit Date: 11/30/2020    Admit Diagnosis: Acute respiratory failure (HCC) [J96.00]  COVID-19 [U07.1]      Subjective:   No change in clinical condidtion  Review of Systems - cant be done    Objective:      Visit Vitals  /83   Pulse 65   Temp 97.1 °F (36.2 °C)   Resp 18   Ht 5' 1\" (1.549 m)   Wt 45.4 kg (100 lb)   SpO2 100%   BMI 18.89 kg/m²       Physical Exam:  Defer to PMD and ID specialist exam  No intake or output data in the 24 hours ending 12/03/20 1438  Recent Results (from the past 24 hour(s))   CBC WITH AUTOMATED DIFF    Collection Time: 12/03/20  9:59 AM   Result Value Ref Range    WBC 12.0 (H) 3.6 - 11.0 K/uL    RBC 3.80 3.80 - 5.20 M/uL    HGB 11.4 (L) 11.5 - 16.0 g/dL    HCT 36.2 35.0 - 47.0 %    MCV 95.3 80.0 - 99.0 FL    MCH 30.0 26.0 - 34.0 PG    MCHC 31.5 30.0 - 36.5 g/dL    RDW 14.3 11.5 - 14.5 %    PLATELET 855 309 - 905 K/uL    MPV 11.6 8.9 - 12.9 FL    NEUTROPHILS 88 (H) 32 - 75 %    LYMPHOCYTES 6 (L) 12 - 49 %    MONOCYTES 4 (L) 5 - 13 %    EOSINOPHILS 0 0 - 7 %    BASOPHILS 0 0 - 1 %    IMMATURE GRANULOCYTES 2 (H) 0.0 - 0.5 %    ABS. NEUTROPHILS 10.8 (H) 1.8 - 8.0 K/UL    ABS. LYMPHOCYTES 0.7 (L) 0.8 - 3.5 K/UL    ABS. MONOCYTES 0.5 0.0 - 1.0 K/UL    ABS. EOSINOPHILS 0.0 0.0 - 0.4 K/UL    ABS. BASOPHILS 0.0 0.0 - 0.1 K/UL    ABS. IMM.  GRANS. 0.3 (H) 0.00 - 0.04 K/UL    DF AUTOMATED     METABOLIC PANEL, COMPREHENSIVE    Collection Time: 12/03/20  9:59 AM   Result Value Ref Range    Sodium 148 (H) 136 - 145 mmol/L    Potassium 3.5 3.5 - 5.1 mmol/L    Chloride 115 (H) 97 - 108 mmol/L    CO2 30 21 - 32 mmol/L    Anion gap 3 (L) 5 - 15 mmol/L    Glucose 139 (H) 65 - 100 mg/dL    BUN 31 (H) 6 - 20 mg/dL    Creatinine 0.67 0.55 - 1.02 mg/dL    BUN/Creatinine ratio 46 (H) 12 - 20      GFR est AA >60 >60 ml/min/1.73m2    GFR est non-AA >60 >60 ml/min/1.73m2    Calcium 8.1 (L) 8.5 - 10.1 mg/dL    Bilirubin, total 0.3 0.2 - 1.0 mg/dL    AST (SGOT) 63 (H) 15 - 37 U/L    ALT (SGPT) 46 12 - 78 U/L    Alk. phosphatase 83 45 - 117 U/L    Protein, total 6.3 (L) 6.4 - 8.2 g/dL    Albumin 2.4 (L) 3.5 - 5.0 g/dL    Globulin 3.9 2.0 - 4.0 g/dL    A-G Ratio 0.6 (L) 1.1 - 2.2     C REACTIVE PROTEIN, QT    Collection Time: 12/03/20  9:59 AM   Result Value Ref Range    C-Reactive protein 1.68 (H) 0.00 - 0.60 mg/dL   LD    Collection Time: 12/03/20  9:59 AM   Result Value Ref Range     (H) 81 - 246 U/L        No intake or output data in the 24 hours ending 12/03/20 1438       Data Review:   Recent Labs     12/03/20  0959   *   K 3.5   BUN 31*   CREA 0.67   WBC 12.0*   HGB 11.4*   HCT 36.2        [unfilled]   Assessment/Plan:     Active Problems:    Acute respiratory failure ([facility] [address] (11/30/2020)      COVID-19 (11/30/2020)        DIAGNOSES  1. Hypernatremia  2. COVID-19 pneumonia  3. Dementia  4. CKD  5. Malnutrition   DISCUSSION AND PLAN   It was down from 1 9 unit drop   Definitely more than intendeddiscontinue D5   Potassium is slightly low and we  Can give IV KCL      This dictation was done by dragon, computer voice recognition software. Often unanticipated grammatical, syntax, phones and other interpretive errors are inadvertently transcribed. Please excuse errors that have escaped final proofreading.

## 2020-12-04 NOTE — PROGRESS NOTES
Physician Progress Note      Heaven Hanson  CSN #:                  [id number]  :                       10/17/1926  ADMIT DATE:       2020 9:43 AM  DISCH DATE:  RESPONDING  PROVIDER #:        Armando Drummond MD          QUERY TEXT:    Dr. Roberto Galloway    Pt admitted with Covid pneumonitis. Noted documentation of Sepsis on  by ordered infectious disease consultant. If possible, please document in progress notes and discharge summary:    The medical record reflects the following:  Risk Factors: Covid positive, acute respiratory failure, acute kidney injury, encephalopathy  Clinical Indicators: tachycardia, tachypnea, WBC 14.4, Creatinine 1.3, Lactic Acid 2.5, CRP 3.6  Treatment: IV Rocephin, Oral Zithromax    Thank you,  Elizabeth Smith RN, CCDS, CPC  Options provided:  -- Sepsis confirmed present on admission  -- Sepsis ruled out  -- Other - I will add my own diagnosis  -- Disagree - Not applicable / Not valid  -- Disagree - Clinically unable to determine / Unknown  -- Refer to Clinical Documentation Reviewer    PROVIDER RESPONSE TEXT:    The diagnosis of Sepsis was ruled out.     Query created by: Raj Mckinney on 2020 1:57 PM      Electronically signed by:  Armando Drummond MD 12/3/2020 8:50 PM

## 2020-12-04 NOTE — PROGRESS NOTES
12/4/2020 5:50 PM    Admit Date: 11/30/2020    Admit Diagnosis: Acute respiratory failure (HCC) [J96.00]  COVID-19 [U07.1]      Subjective:   No change in clinical condidtion  Review of Systems - cant be done because of patient mental status    Objective:      Visit Vitals  BP (!) 142/63 (BP 1 Location: Left arm, BP Patient Position: At rest)   Pulse 83   Temp 99 °F (37.2 °C)   Resp 17   Ht 5' 1\" (1.549 m)   Wt 45.4 kg (100 lb)   SpO2 93%   BMI 18.89 kg/m²       Physical Exam:  Elderly cachectic thin  female lying in bed  Chronically ill  Not in any feeds  Breathing comfortably  Scaphoid abdomen  No leg swelling  Nonverbal    No intake or output data in the 24 hours ending 12/04/20 1647  Recent Results (from the past 24 hour(s))   C REACTIVE PROTEIN, QT    Collection Time: 12/04/20  1:47 PM   Result Value Ref Range    C-Reactive protein 1.76 (H) 0.00 - 0.60 mg/dL   LD    Collection Time: 12/04/20  1:47 PM   Result Value Ref Range    LD 1,044 (H) 81 - 246 U/L   CBC WITH AUTOMATED DIFF    Collection Time: 12/04/20  1:47 PM   Result Value Ref Range    WBC 12.5 (H) 3.6 - 11.0 K/uL    RBC 4.55 3.80 - 5.20 M/uL    HGB 13.5 11.5 - 16.0 g/dL    HCT 41.8 35.0 - 47.0 %    MCV 91.9 80.0 - 99.0 FL    MCH 29.7 26.0 - 34.0 PG    MCHC 32.3 30.0 - 36.5 g/dL    RDW 14.0 11.5 - 14.5 %    PLATELET 548 380 - 114 K/uL    MPV 11.3 8.9 - 12.9 FL    NEUTROPHILS 91 (H) 32 - 75 %    LYMPHOCYTES 6 (L) 12 - 49 %    MONOCYTES 1 (L) 5 - 13 %    EOSINOPHILS 0 0 - 7 %    BASOPHILS 0 0 - 1 %    IMMATURE GRANULOCYTES 2 (H) 0.0 - 0.5 %    ABS. NEUTROPHILS 11.6 (H) 1.8 - 8.0 K/UL    ABS. LYMPHOCYTES 0.8 0.8 - 3.5 K/UL    ABS. MONOCYTES 0.1 0.0 - 1.0 K/UL    ABS. EOSINOPHILS 0.0 0.0 - 0.4 K/UL    ABS. BASOPHILS 0.0 0.0 - 0.1 K/UL    ABS. IMM.  GRANS. 0.3 (H) 0.00 - 0.04 K/UL    DF AUTOMATED     METABOLIC PANEL, COMPREHENSIVE    Collection Time: 12/04/20  1:47 PM   Result Value Ref Range    Sodium 147 (H) 136 - 145 mmol/L    Potassium 4.1 3.5 - 5.1 mmol/L    Chloride 110 (H) 97 - 108 mmol/L    CO2 30 21 - 32 mmol/L    Anion gap 7 5 - 15 mmol/L    Glucose 107 (H) 65 - 100 mg/dL    BUN 29 (H) 6 - 20 mg/dL    Creatinine 0.66 0.55 - 1.02 mg/dL    BUN/Creatinine ratio 44 (H) 12 - 20      GFR est AA >60 >60 ml/min/1.73m2    GFR est non-AA >60 >60 ml/min/1.73m2    Calcium 8.4 (L) 8.5 - 10.1 mg/dL    Bilirubin, total 0.7 0.2 - 1.0 mg/dL    AST (SGOT) 770 (H) 15 - 37 U/L    ALT (SGPT) 451 (H) 12 - 78 U/L    Alk. phosphatase 158 (H) 45 - 117 U/L    Protein, total 6.7 6.4 - 8.2 g/dL    Albumin 2.7 (L) 3.5 - 5.0 g/dL    Globulin 4.0 2.0 - 4.0 g/dL    A-G Ratio 0.7 (L) 1.1 - 2.2          No intake or output data in the 24 hours ending 12/04/20 1647       Data Review:   Recent Labs     12/04/20  1347   *   K 4.1   BUN 29*   CREA 0.66   WBC 12.5*   HGB 13.5   HCT 41.8        [unfilled]   Assessment/Plan:     Active Problems:    Acute respiratory failure ([facility] [address] (11/30/2020)      COVID-19 (11/30/2020)        DIAGNOSES  1. Hypernatremia  2. COVID-19 pneumonia  3. Dementia  4. CKD  5. Malnutrition   DISCUSSION AND PLAN   Sodium is down to 147   IV fluids were stopped today   We will restart IV fluids   Continue with feeds and water flushes        This dictation was done by dragon, computer voice recognition software. Often unanticipated grammatical, syntax, phones and other interpretive errors are inadvertently transcribed. Please excuse errors that have escaped final proofreading.

## 2020-12-04 NOTE — ROUTINE PROCESS
Bedside and Verbal shift change report given to Marvin Silver RN (oncoming nurse) by Nanci Quintanilla (offgoing nurse). Report included the following information SBAR, Kardex, Intake/Output, MAR, Recent Results and Cardiac Rhythm sinus Rhythm.

## 2020-12-04 NOTE — PROGRESS NOTES
General Daily Progress Note          Patient Name:   Wayne Ferrer       YOB: 1926       Age:  80 y.o. Admit Date: 11/30/2020      Subjective:       Patient not in any distress           Objective:     Visit Vitals  BP (!) 142/63 (BP 1 Location: Left arm, BP Patient Position: At rest)   Pulse 83   Temp 99 °F (37.2 °C)   Resp 17   Ht 5' 1\" (1.549 m)   Wt 45.4 kg (100 lb)   SpO2 93%   BMI 18.89 kg/m²        No results found for this or any previous visit (from the past 24 hour(s)). [unfilled]      Review of Systems    Unable  to obtain. Physical Exam:      Constitutional: pt is oriented to person, place, and time. HENT:   Head: Normocephalic and atraumatic. Eyes: Pupils are equal, round, and reactive to light. EOM are normal.   Cardiovascular: Normal rate, regular rhythm and normal heart sounds. Pulmonary/Chest: Breath sounds normal. No wheezes. No rales. Exhibits no tenderness. Abdominal: Soft. Bowel sounds are normal. There is no abdominal tenderness. There is no rebound and no guarding. Musculoskeletal: Normal range of motion. Neurological: pt is alert and oriented to person, place, and time. XR CHEST SNGL V   Final Result   FINDINGS: Impression: Frontal single view chest. Obscuring overlying cardiac   leads. Normal heart size. Calcified aorta. No vascular congestion. The lungs are somewhat hyperinflated, likely from air trapping. Unchanged small   opacity overlying the right fifth rib. Calcified biapical pleural-parenchymal   thickening unchanged. No consolidation, effusion, pneumothorax. Bony demineralization. Thoracic and lumbar vertebral body compressions grossly   similar to previous. Bilateral rib deformities similar to previous. No free air under the diaphragm. No results found for this or any previous visit (from the past 24 hour(s)).     Results     Procedure Component Value Units Date/Time    INFLUENZA A & B AG (RAPID TEST) [[id number]] Collected:  11/30/20 1300    Order Status:  Completed Specimen:  Nasopharyngeal from Nasal washing Updated:  11/30/20 1320     Influenza A Antigen Negative        Influenza B Antigen Negative       CULTURE, URINE [[id number]] Collected:  11/30/20 1140    Order Status:  Completed Specimen:  Urine Updated:  12/03/20 0827     Special Requests: --        No Special Requests  Reflexed from [id number]       Culture result: No Growth (<1000 cfu/mL)       INFLUENZA A & B AG (RAPID TEST) [[id number]] Collected:  11/30/20 1045    Order Status:  Canceled Specimen:  Nasal washing     INFLUENZA A & B AG (RAPID TEST) [[id number]] Collected:  11/30/20 1000    Order Status:  Canceled Specimen:  Nasopharyngeal from Nasal washing     CULTURE, BLOOD, PAIRED [[id number]] Collected:  11/26/20 1640    Order Status:  Completed Specimen:  Blood Updated:  12/03/20 0749     Special Requests: No Special Requests        Culture result: No growth 6 days              Labs:     Recent Labs     12/03/20  0959 12/02/20  0845   WBC 12.0* 14.4*   HGB 11.4* 13.0   HCT 36.2 42.1    325     Recent Labs     12/03/20  0959 12/02/20  0845   * 157*   K 3.5 4.5   * 125*   CO2 30 29   BUN 31* 41*   CREA 0.67 0.89   * 186*   CA 8.1* 8.7     Recent Labs     12/03/20  0959 12/02/20  0845   ALT 46 14   AP 83 79   TBILI 0.3 0.2   TP 6.3* 6.6   ALB 2.4* 2.3*   GLOB 3.9 4.3*     No results for input(s): INR, PTP, APTT, INREXT, INREXT in the last 72 hours. Recent Labs     12/02/20  0845   FERR 429*      No results found for: FOL, RBCF   No results for input(s): PH, PCO2, PO2 in the last 72 hours. No results for input(s): CPK, CKNDX, TROIQ in the last 72 hours.     No lab exists for component: CPKMB  No results found for: CHOL, CHOLX, CHLST, CHOLV, HDL, HDLP, LDL, LDLC, DLDLP, TGLX, TRIGL, TRIGP, CHHD, CHHDX  No results found for: Erle Rena  Lab Results   Component Value Date/Time    Color Yellow/Straw 11/30/2020 11:40 AM    Appearance Turbid (A) 11/30/2020 11:40 AM    Specific gravity 1.012 11/30/2020 11:40 AM    pH (UA) 5.0 11/30/2020 11:40 AM    Protein 100 (A) 11/30/2020 11:40 AM    Glucose Negative 11/30/2020 11:40 AM    Ketone 20 (A) 11/30/2020 11:40 AM    Bilirubin Negative 11/30/2020 11:40 AM    Urobilinogen 0.1 11/30/2020 11:40 AM    Nitrites Negative 11/30/2020 11:40 AM    Leukocyte Esterase Large (A) 11/30/2020 11:40 AM    Bacteria Negative 11/30/2020 11:40 AM    WBC >100 (H) 11/30/2020 11:40 AM    RBC 0-5 11/30/2020 11:40 AM         Assessment:     COVID-19 positive  Acute hypoxemic respiratory failure on 3 L oxygen nasal cannula  Advanced dementia  Hypernatremia  UTI  Hypokalemia  Acute kidney injury  Lactic acidosis      Plan:     Patient on IV Zithromax and Rocephin  On IV Decadron  Ordered convalescent plasma  IV fluid changed to D5 75 cc/h  Overall prognosis poor  Monitor sodium level and urine cultures   Today's labs pending      Current Facility-Administered Medications:     heparin (porcine) injection 5,000 Units, 5,000 Units, SubCUTAneous, Q8H, Anand Zambrano MD, 5,000 Units at 12/04/20 0600    remdesivir 100 mg in 0.9% sodium chloride 250 mL IVPB, 100 mg, IntraVENous, Q24H, Sanjana Pino MD, 100 mg at 12/03/20 1807    dorzolamide (TRUSOPT) 2 % ophthalmic solution 1 Drop, 1 Drop, Ophthalmic, BID, Anand Zambrano MD, 1 Drop at 12/04/20 1048    latanoprost (XALATAN) 0.005 % ophthalmic solution 1 Drop, 1 Drop, Both Eyes, QHS, Anand Zambrano MD, 1 Drop at 12/03/20 2200    azithromycin (ZITHROMAX) tablet 500 mg, 500 mg, Oral, See Admin Instructions, Josette Zambrano MD    acetaminophen (TYLENOL) tablet 650 mg, 650 mg, Oral, Q6H PRN, 650 mg at 12/03/20 2125 **OR** acetaminophen (TYLENOL) suppository 650 mg, 650 mg, Rectal, Q6H PRN, Anand Zambrano MD    polyethylene glycol (MIRALAX) packet 17 g, 17 g, Oral, DAILY PRN, Anand Zambrano MD    ondansetron (ZOFRAN ODT) tablet 4 mg, 4 mg, Oral, Q8H PRN **OR** ondansetron (ZOFRAN) injection 4 mg, 4 mg, IntraVENous, Q6H PRN, Skip Zambrano MD    cefTRIAXone (ROCEPHIN) 1 g in 0.9% sodium chloride (MBP/ADV) 50 mL MBP, 1 g, IntraVENous, Q24H, Anand Zambrano MD, Last Rate: 100 mL/hr at 12/03/20 1653, 1 g at 12/03/20 1653    dexamethasone (DECADRON) 4 mg/mL injection 4 mg, 4 mg, IntraVENous, Q6H, Anand Zambrano MD, 4 mg at 12/04/20 1040    timolol (TIMOPTIC) 0.5 % ophthalmic solution 1 Drop, 1 Drop, Both Eyes, BID, Anand Zambrano MD, 1 Drop at 12/04/20 1048

## 2020-12-04 NOTE — PROGRESS NOTES
Progress Note    Patient: Vikas Liu MRN: [id number]  SSN: [id number]    YOB: 1926  Age: 80 y.o. Sex: female      Admit Date: 11/30/2020    LOS: 4 days     Subjective:   Patient followed for sepsis with Covid-19  pneumonia with respiratory failure. Patient is somnolent today but appears to be resting comfortably. She is not on nasal O2 at this time. Objective:     Vitals:    12/03/20 2002 12/03/20 2356 12/04/20 0449 12/04/20 0733   BP: (!) 162/77 (!) 151/81 (!) 142/96 (!) 142/63   Pulse: 76 70 63 83   Resp: 16 14 17 17   Temp: (!) 96.6 °F (35.9 °C) 97.5 °F (36.4 °C) 97.6 °F (36.4 °C) 99 °F (37.2 °C)   SpO2:  93% 91% 93%   Weight:       Height:            Intake and Output:  Current Shift: No intake/output data recorded. Last three shifts: No intake/output data recorded. Physical Exam:    Vitals signs and nursing note reviewed. Constitutional:       General: She is not in acute distress. Appearance: She is chronically ill-appearing. Comments: Cachectic    HENT: Not on nasal O2 at this time    Pulmonary:  Clear but diminished BS   Abdominal:      Palpations: Abdomen is soft. Tenderness: There is no abdominal tenderness. There is no guarding. Genitourinary:     Comments: No Dowell catheter  Musculoskeletal:      Right lower leg: No edema. Left lower leg: No edema. Skin:     Findings: No rash. Neurological:      Mental Status: She is disoriented. Psychiatric:      Comments: Unable to assess because of dementia      Lab/Data Review:     WBC Pending <12,000  LDH  Pending <318  <250  Ferritin 429    Procalciton <0.05  CRP Pending <1.68 < 3.68    Blood cultures (11/26)  No growth FINAL  Urine culture (11/30)  No growth FINAL  Assessment:     Active Problems:    Acute respiratory failure ([facility] [address] (11/30/2020)      COVID-19 (11/30/2020)      1. Covid-19 infection with possible pneumonitis, Day #4 IV Remdesivir, Azithromycin, Decadron, s/p convalescent plasma  2. Acute hypoxic respiratory failure, on nasal O2  3. No evidence of UTI with marked pyuria, culture negative  4. Sepsis with leukocytosis and elevated lactic acid and CRP  5. Elevated LDH, Ferritin and CRP, possibly secondary to #1  6. Dementia    Comment:  Leukocytosis likely secondary to steroids with decreasing CRP. Respiratory status appears to be stable. She appears comfortable off O2. Plan:   1. Continue Remdesivir 1 more day, Azithromycin, Ceftriaxone, Decadron  2. Hold off on Actemra  3.  In am, repeat CRP, LDH, ferritin, done        Signed By: Stanford Zaldivar MD     December 4, 2020

## 2020-12-04 NOTE — PROGRESS NOTES
17:40 Daughter return call, informed of  Patient found on floor doctor was called , no injuries, states she have done that at home with me, get up to the bathroom then cant get back to bed sh would sit on the floor.  I doubt she fell.'

## 2020-12-04 NOTE — PROGRESS NOTES
FRANKLYN Plan:    -d/c to [address]  -Medicare 2nd IM Letter      Patient is a long term care resident at [address]. Will continue to monitor and follow re: discharge disposition.        MARJORIE Smith

## 2020-12-04 NOTE — ROUTINE PROCESS
Bedside and Verbal shift change report given to Yessy Birch RN (oncoming nurse) by Terri Norris RN(offgoing nurse). Report included the following information SBAR, Kardex, MAR, Accordion and Cardiac Rhythm Sinus Rhythm.

## 2020-12-05 NOTE — PROGRESS NOTES
Dr. Roberto Tenorio notified of unsuccessful attempts at NGT placement. Received orders for consult to GI for difficult NGT placement. Orders entered as received. Attempted to notify Dr. Quintero of consult, no answer at time of call, voicemail left with Dr. Quintero notifying him of consult.

## 2020-12-05 NOTE — ROUTINE PROCESS
Bedside and Verbal shift change report given to Rose Becerra (oncoming nurse) by Nani Tucker (offgoing nurse). Report included the following information SBAR, Kardex, Intake/Output, MAR, Accordion and Recent Results.

## 2020-12-05 NOTE — PROGRESS NOTES
General Daily Progress Note          Patient Name:   Shaheed Vicente       YOB: 1926       Age:  80 y.o. Admit Date: 11/30/2020      Subjective:       Patient not in any distress  Patient not eating drinking well  On 3 L oxygen by nasal cannula           Objective:     Visit Vitals  BP (!) 152/64   Pulse 65   Temp 98.7 °F (37.1 °C)   Resp 18   Ht 5' 1\" (1.549 m)   Wt 45.4 kg (100 lb)   SpO2 96%   BMI 18.89 kg/m²        Recent Results (from the past 24 hour(s))   C REACTIVE PROTEIN, QT    Collection Time: 12/04/20  1:47 PM   Result Value Ref Range    C-Reactive protein 1.76 (H) 0.00 - 0.60 mg/dL   LD    Collection Time: 12/04/20  1:47 PM   Result Value Ref Range    LD 1,044 (H) 81 - 246 U/L   CBC WITH AUTOMATED DIFF    Collection Time: 12/04/20  1:47 PM   Result Value Ref Range    WBC 12.5 (H) 3.6 - 11.0 K/uL    RBC 4.55 3.80 - 5.20 M/uL    HGB 13.5 11.5 - 16.0 g/dL    HCT 41.8 35.0 - 47.0 %    MCV 91.9 80.0 - 99.0 FL    MCH 29.7 26.0 - 34.0 PG    MCHC 32.3 30.0 - 36.5 g/dL    RDW 14.0 11.5 - 14.5 %    PLATELET 237 719 - 210 K/uL    MPV 11.3 8.9 - 12.9 FL    NEUTROPHILS 91 (H) 32 - 75 %    LYMPHOCYTES 6 (L) 12 - 49 %    MONOCYTES 1 (L) 5 - 13 %    EOSINOPHILS 0 0 - 7 %    BASOPHILS 0 0 - 1 %    IMMATURE GRANULOCYTES 2 (H) 0.0 - 0.5 %    ABS. NEUTROPHILS 11.6 (H) 1.8 - 8.0 K/UL    ABS. LYMPHOCYTES 0.8 0.8 - 3.5 K/UL    ABS. MONOCYTES 0.1 0.0 - 1.0 K/UL    ABS. EOSINOPHILS 0.0 0.0 - 0.4 K/UL    ABS. BASOPHILS 0.0 0.0 - 0.1 K/UL    ABS. IMM.  GRANS. 0.3 (H) 0.00 - 0.04 K/UL    DF AUTOMATED     METABOLIC PANEL, COMPREHENSIVE    Collection Time: 12/04/20  1:47 PM   Result Value Ref Range    Sodium 147 (H) 136 - 145 mmol/L    Potassium 4.1 3.5 - 5.1 mmol/L    Chloride 110 (H) 97 - 108 mmol/L    CO2 30 21 - 32 mmol/L    Anion gap 7 5 - 15 mmol/L    Glucose 107 (H) 65 - 100 mg/dL    BUN 29 (H) 6 - 20 mg/dL    Creatinine 0.66 0.55 - 1.02 mg/dL    BUN/Creatinine ratio 44 (H) 12 - 20      GFR est AA >60 >60 ml/min/1.73m2    GFR est non-AA >60 >60 ml/min/1.73m2    Calcium 8.4 (L) 8.5 - 10.1 mg/dL    Bilirubin, total 0.7 0.2 - 1.0 mg/dL    AST (SGOT) 770 (H) 15 - 37 U/L    ALT (SGPT) 451 (H) 12 - 78 U/L    Alk. phosphatase 158 (H) 45 - 117 U/L    Protein, total 6.7 6.4 - 8.2 g/dL    Albumin 2.7 (L) 3.5 - 5.0 g/dL    Globulin 4.0 2.0 - 4.0 g/dL    A-G Ratio 0.7 (L) 1.1 - 2.2       [unfilled]      Review of Systems    Unable  to obtain. Physical Exam:      Constitutional: pt is oriented to person, place, and time. HENT:   Head: Normocephalic and atraumatic. Eyes: Pupils are equal, round, and reactive to light. EOM are normal.   Cardiovascular: Normal rate, regular rhythm and normal heart sounds. Pulmonary/Chest: Breath sounds normal. No wheezes. No rales. Exhibits no tenderness. Abdominal: Soft. Bowel sounds are normal. There is no abdominal tenderness. There is no rebound and no guarding. Musculoskeletal: Normal range of motion. Neurological: pt is alert and oriented to person, place, and time. XR CHEST SNGL V   Final Result   FINDINGS: Impression: Frontal single view chest. Obscuring overlying cardiac   leads. Normal heart size. Calcified aorta. No vascular congestion. The lungs are somewhat hyperinflated, likely from air trapping. Unchanged small   opacity overlying the right fifth rib. Calcified biapical pleural-parenchymal   thickening unchanged. No consolidation, effusion, pneumothorax. Bony demineralization. Thoracic and lumbar vertebral body compressions grossly   similar to previous. Bilateral rib deformities similar to previous. No free air under the diaphragm.            Recent Results (from the past 24 hour(s))   C REACTIVE PROTEIN, QT    Collection Time: 12/04/20  1:47 PM   Result Value Ref Range    C-Reactive protein 1.76 (H) 0.00 - 0.60 mg/dL   LD    Collection Time: 12/04/20  1:47 PM   Result Value Ref Range    LD 1,044 (H) 81 - 246 U/L   CBC WITH AUTOMATED DIFF    Collection Time: 12/04/20  1:47 PM   Result Value Ref Range    WBC 12.5 (H) 3.6 - 11.0 K/uL    RBC 4.55 3.80 - 5.20 M/uL    HGB 13.5 11.5 - 16.0 g/dL    HCT 41.8 35.0 - 47.0 %    MCV 91.9 80.0 - 99.0 FL    MCH 29.7 26.0 - 34.0 PG    MCHC 32.3 30.0 - 36.5 g/dL    RDW 14.0 11.5 - 14.5 %    PLATELET 800 582 - 025 K/uL    MPV 11.3 8.9 - 12.9 FL    NEUTROPHILS 91 (H) 32 - 75 %    LYMPHOCYTES 6 (L) 12 - 49 %    MONOCYTES 1 (L) 5 - 13 %    EOSINOPHILS 0 0 - 7 %    BASOPHILS 0 0 - 1 %    IMMATURE GRANULOCYTES 2 (H) 0.0 - 0.5 %    ABS. NEUTROPHILS 11.6 (H) 1.8 - 8.0 K/UL    ABS. LYMPHOCYTES 0.8 0.8 - 3.5 K/UL    ABS. MONOCYTES 0.1 0.0 - 1.0 K/UL    ABS. EOSINOPHILS 0.0 0.0 - 0.4 K/UL    ABS. BASOPHILS 0.0 0.0 - 0.1 K/UL    ABS. IMM. GRANS. 0.3 (H) 0.00 - 0.04 K/UL    DF AUTOMATED     METABOLIC PANEL, COMPREHENSIVE    Collection Time: 12/04/20  1:47 PM   Result Value Ref Range    Sodium 147 (H) 136 - 145 mmol/L    Potassium 4.1 3.5 - 5.1 mmol/L    Chloride 110 (H) 97 - 108 mmol/L    CO2 30 21 - 32 mmol/L    Anion gap 7 5 - 15 mmol/L    Glucose 107 (H) 65 - 100 mg/dL    BUN 29 (H) 6 - 20 mg/dL    Creatinine 0.66 0.55 - 1.02 mg/dL    BUN/Creatinine ratio 44 (H) 12 - 20      GFR est AA >60 >60 ml/min/1.73m2    GFR est non-AA >60 >60 ml/min/1.73m2    Calcium 8.4 (L) 8.5 - 10.1 mg/dL    Bilirubin, total 0.7 0.2 - 1.0 mg/dL    AST (SGOT) 770 (H) 15 - 37 U/L    ALT (SGPT) 451 (H) 12 - 78 U/L    Alk.  phosphatase 158 (H) 45 - 117 U/L    Protein, total 6.7 6.4 - 8.2 g/dL    Albumin 2.7 (L) 3.5 - 5.0 g/dL    Globulin 4.0 2.0 - 4.0 g/dL    A-G Ratio 0.7 (L) 1.1 - 2.2         Results     Procedure Component Value Units Date/Time    INFLUENZA A & B AG (RAPID TEST) [[id number]] Collected:  11/30/20 1300    Order Status:  Completed Specimen:  Nasopharyngeal from Nasal washing Updated:  11/30/20 1320     Influenza A Antigen Negative        Influenza B Antigen Negative       CULTURE, URINE [[id number]] Collected:  11/30/20 1140    Order Status:  Completed Specimen:  Urine Updated:  12/03/20 0827     Special Requests: --        No Special Requests  Reflexed from [id number]       Culture result: No Growth (<1000 cfu/mL)       INFLUENZA A & B AG (RAPID TEST) [[id number]] Collected:  11/30/20 1045    Order Status:  Canceled Specimen:  Nasal washing     INFLUENZA A & B AG (RAPID TEST) [[id number]] Collected:  11/30/20 1000    Order Status:  Canceled Specimen:  Nasopharyngeal from Nasal washing     CULTURE, BLOOD, PAIRED [[id number]] Collected:  11/26/20 1640    Order Status:  Completed Specimen:  Blood Updated:  12/03/20 0749     Special Requests: No Special Requests        Culture result: No growth 6 days              Labs:     Recent Labs     12/04/20  1347 12/03/20  0959   WBC 12.5* 12.0*   HGB 13.5 11.4*   HCT 41.8 36.2    263     Recent Labs     12/04/20  1347 12/03/20  0959   * 148*   K 4.1 3.5   * 115*   CO2 30 30   BUN 29* 31*   CREA 0.66 0.67   * 139*   CA 8.4* 8.1*     Recent Labs     12/04/20  1347 12/03/20  0959   * 46   * 83   TBILI 0.7 0.3   TP 6.7 6.3*   ALB 2.7* 2.4*   GLOB 4.0 3.9     No results for input(s): INR, PTP, APTT, INREXT, INREXT in the last 72 hours. Recent Labs     12/03/20  0959   FERR 405*      No results found for: FOL, RBCF   No results for input(s): PH, PCO2, PO2 in the last 72 hours. No results for input(s): CPK, CKNDX, TROIQ in the last 72 hours.     No lab exists for component: CPKMB  No results found for: CHOL, CHOLX, CHLST, CHOLV, HDL, HDLP, LDL, LDLC, DLDLP, TGLX, TRIGL, TRIGP, CHHD, CHHDX  No results found for: GLUCPOC  Lab Results   Component Value Date/Time    Color Yellow/Straw 11/30/2020 11:40 AM    Appearance Turbid (A) 11/30/2020 11:40 AM    Specific gravity 1.012 11/30/2020 11:40 AM    pH (UA) 5.0 11/30/2020 11:40 AM    Protein 100 (A) 11/30/2020 11:40 AM    Glucose Negative 11/30/2020 11:40 AM    Ketone 20 (A) 11/30/2020 11:40 AM    Bilirubin Negative 11/30/2020 11:40 AM Urobilinogen 0.1 11/30/2020 11:40 AM    Nitrites Negative 11/30/2020 11:40 AM    Leukocyte Esterase Large (A) 11/30/2020 11:40 AM    Bacteria Negative 11/30/2020 11:40 AM    WBC >100 (H) 11/30/2020 11:40 AM    RBC 0-5 11/30/2020 11:40 AM         Assessment:     COVID-19 positive  Acute hypoxemic respiratory failure on 3 L oxygen nasal cannula  Advanced dementia  Hypernatremia  UTI  Hypokalemia  Acute kidney injury  Lactic acidosis  Elevated LFTs      Plan:     Patient on IV Zithromax and Rocephin  On IV Decadron  Ordered convalescent plasma  IV fluid changed to D5 75 cc/h  Overall prognosis poor  Monitor sodium level and urine cultures   Today's labs pending  NG tube placement for feeding      Current Facility-Administered Medications:     dextrose 5% infusion, 50 mL/hr, IntraVENous, CONTINUOUS, Abigail Garcia MD, Last Rate: 50 mL/hr at 12/04/20 1705, 50 mL/hr at 12/04/20 1705    heparin (porcine) injection 5,000 Units, 5,000 Units, SubCUTAneous, Q8H, Anand Zambrano MD, 5,000 Units at 12/05/20 0550    remdesivir 100 mg in 0.9% sodium chloride 250 mL IVPB, 100 mg, IntraVENous, Q24H, Yash Ramos MD, 100 mg at 12/04/20 2136    dorzolamide (TRUSOPT) 2 % ophthalmic solution 1 Drop, 1 Drop, Ophthalmic, BID, Anand Zambrano MD, 1 Drop at 12/05/20 0499    latanoprost (XALATAN) 0.005 % ophthalmic solution 1 Drop, 1 Drop, Both Eyes, QHS, Anand Zambrano MD, 1 Drop at 12/04/20 2136    azithromycin (ZITHROMAX) tablet 500 mg, 500 mg, Oral, See Admin Instructions, Esdras Zambrano MD    acetaminophen (TYLENOL) tablet 650 mg, 650 mg, Oral, Q6H PRN, 650 mg at 12/03/20 2125 **OR** acetaminophen (TYLENOL) suppository 650 mg, 650 mg, Rectal, Q6H PRN, Esdras Zambrano MD    polyethylene glycol (MIRALAX) packet 17 g, 17 g, Oral, DAILY PRN, Esdras Zambrano MD    ondansetron (ZOFRAN ODT) tablet 4 mg, 4 mg, Oral, Q8H PRN **OR** ondansetron (ZOFRAN) injection 4 mg, 4 mg, IntraVENous, Q6H PRN, Juan Manuel, Othella Lipoma, MD    cefTRIAXone (ROCEPHIN) 1 g in 0.9% sodium chloride (MBP/ADV) 50 mL MBP, 1 g, IntraVENous, Q24H, Anand Zambrano MD, Last Rate: 100 mL/hr at 12/04/20 1710, 1 g at 12/04/20 1710    dexamethasone (DECADRON) 4 mg/mL injection 4 mg, 4 mg, IntraVENous, Q6H, Anand Zambrano MD, 4 mg at 12/05/20 1306    timolol (TIMOPTIC) 0.5 % ophthalmic solution 1 Drop, 1 Drop, Both Eyes, BID, Anand Zambrano MD, 1 Drop at 12/05/20 1677

## 2020-12-05 NOTE — PROGRESS NOTES
Progress Note    Patient: Sandi Hdz MRN: [id number]  SSN: [id number]    YOB: 1926  Age: 80 y.o. Sex: female      Admit Date: 11/30/2020    LOS: 5 days     Subjective:   Patient followed for sepsis with Covid-19  pneumonia with respiratory failure. Patient awake and responsive but confused and trying get out of bed. She is on nasal O2 at 3 L/min. Objective:     Vitals:    12/04/20 1920 12/04/20 2151 12/05/20 0753 12/05/20 1121   BP: 138/81  (!) 142/79 (!) 152/64   Pulse: 81  74 65   Resp: 21  18 18   Temp: 97.7 °F (36.5 °C)  98.6 °F (37 °C) 98.7 °F (37.1 °C)   SpO2: 100% 97% 93% 96%   Weight:       Height:            Intake and Output:  Current Shift: No intake/output data recorded. Last three shifts: No intake/output data recorded. Physical Exam:    Vitals signs and nursing note reviewed. Constitutional:       General: She is not in acute distress. Appearance: She is chronically ill-appearing. Comments: Cachectic    HENT: Nasal O2 3L/min    Pulmonary:  Clear but diminished BS   Abdominal:      Palpations: Abdomen is soft. Tenderness: There is no abdominal tenderness. There is no guarding. Genitourinary:     Comments: No Dowell catheter  Musculoskeletal:      Right lower leg: No edema. Left lower leg: No edema. Skin:     Findings: No rash. Neurological:      Mental Status: She is disoriented. Psychiatric:      Comments: Unable to assess because of dementia      Lab/Data Review:     WBC 12,500  LDH 1,044  <318  <250  Ferritin 405 <429    Procalciton <0.05  CRP Pending 1.76 <1.68 < 3.68    Blood cultures (11/26)  No growth FINAL  Urine culture (11/30)  No growth FINAL  Assessment:     Active Problems:    Acute respiratory failure ([address] (11/30/2020)      COVID-19 (11/30/2020)      1. Covid-19 infection with possible pneumonitis, Day #5 IV Remdesivir, Azithromycin, Decadron, s/p convalescent plasma  2. Acute hypoxic respiratory failure, on nasal O2  3.  No evidence of UTI with marked pyuria, culture negative  4. Sepsis with leukocytosis and elevated lactic acid and CRP  5. Elevated LDH, Ferritin and CRP, possibly secondary to #1  6. Dementia    Comment:  Leukocytosis likely secondary to steroids with decreasing CRP. Respiratory status appears to be stable but her LDH is increasing. Plan:   1. Discontinue Remdesivir after today; continue Azithromycin, Ceftriaxone, Decadron  2. No indication for Actemra at this time  3.  In am, repeat CRP, LDH, ferritin, done        Signed By: Josef Maciel MD     December 5, 2020

## 2020-12-06 NOTE — PROGRESS NOTES
Bedside shift change report given to Lise Meyers RN (oncoming nurse) by Dilan Bhandari RN (offgoing nurse). Report included the following information SBAR, Intake/Output, MAR and Recent Results.

## 2020-12-06 NOTE — PROGRESS NOTES
Problem: Pressure Injury - Risk of  Goal: *Prevention of pressure injury  Description: Document Marcello Scale and appropriate interventions in the flowsheet.   Outcome: Progressing Towards Goal  Note: Pressure Injury Interventions:  Sensory Interventions: Assess changes in LOC    Moisture Interventions: Maintain skin hydration (lotion/cream), Minimize layers    Activity Interventions: PT/OT evaluation    Mobility Interventions: Float heels    Nutrition Interventions: Document food/fluid/supplement intake    Friction and Shear Interventions: Apply protective barrier, creams and emollients

## 2020-12-06 NOTE — PROGRESS NOTES
General Daily Progress Note          Patient Name:   Dwaine Duque       YOB: 1926       Age:  80 y.o. Admit Date: 11/30/2020      Subjective:       Patient not in any distress  Patient not eating drinking well  On 3 L oxygen by nasal cannula           Objective:     Visit Vitals  /73   Pulse 84   Temp 96.8 °F (36 °C)   Resp 18   Ht 5' 1\" (1.549 m)   Wt 45.4 kg (100 lb)   SpO2 98%   BMI 18.89 kg/m²        Recent Results (from the past 24 hour(s))   C REACTIVE PROTEIN, QT    Collection Time: 12/05/20  6:58 PM   Result Value Ref Range    C-Reactive protein 0.98 (H) 0.00 - 0.60 mg/dL   LD    Collection Time: 12/05/20  6:58 PM   Result Value Ref Range    LD 1,764 (H) 81 - 246 U/L   CBC WITH AUTOMATED DIFF    Collection Time: 12/05/20  6:58 PM   Result Value Ref Range    WBC 13.3 (H) 3.6 - 11.0 K/uL    RBC 4.83 3.80 - 5.20 M/uL    HGB 14.5 11.5 - 16.0 g/dL    HCT 44.1 35.0 - 47.0 %    MCV 91.3 80.0 - 99.0 FL    MCH 30.0 26.0 - 34.0 PG    MCHC 32.9 30.0 - 36.5 g/dL    RDW 14.0 11.5 - 14.5 %    PLATELET 355 155 - 699 K/uL    MPV 12.3 8.9 - 12.9 FL    NRBC 0.0 0  WBC    ABSOLUTE NRBC 0.00 0.00 - 0.01 K/uL    NEUTROPHILS 88 (H) 32 - 75 %    LYMPHOCYTES 7 (L) 12 - 49 %    MONOCYTES 5 5 - 13 %    EOSINOPHILS 0 0 - 7 %    BASOPHILS 0 0 - 1 %    IMMATURE GRANULOCYTES 4 (H) 0.0 - 0.5 %    ABS. NEUTROPHILS 11.7 (H) 1.8 - 8.0 K/UL    ABS. LYMPHOCYTES 1.0 0.8 - 3.5 K/UL    ABS. MONOCYTES 0.6 0.0 - 1.0 K/UL    ABS. EOSINOPHILS 0.0 0.0 - 0.4 K/UL    ABS. BASOPHILS 0.0 0.0 - 0.1 K/UL    ABS. IMM.  GRANS. 0.5 (H) 0.00 - 0.04 K/UL    DF AUTOMATED     METABOLIC PANEL, COMPREHENSIVE    Collection Time: 12/05/20  6:58 PM   Result Value Ref Range    Sodium 139 136 - 145 mmol/L    Potassium 4.1 3.5 - 5.1 mmol/L    Chloride 100 97 - 108 mmol/L    CO2 33 (H) 21 - 32 mmol/L    Anion gap 6 5 - 15 mmol/L    Glucose 114 (H) 65 - 100 mg/dL    BUN 19 6 - 20 mg/dL    Creatinine 0.64 0.55 - 1.02 mg/dL BUN/Creatinine ratio 30 (H) 12 - 20      GFR est AA >60 >60 ml/min/1.73m2    GFR est non-AA >60 >60 ml/min/1.73m2    Calcium 7.8 (L) 8.5 - 10.1 mg/dL    Bilirubin, total 2.9 (H) 0.2 - 1.0 mg/dL    AST (SGOT) >2,000 (H) 15 - 37 U/L    ALT (SGPT) 1,363 (H) 12 - 78 U/L    Alk. phosphatase 314 (H) 45 - 117 U/L    Protein, total 5.9 (L) 6.4 - 8.2 g/dL    Albumin 2.3 (L) 3.5 - 5.0 g/dL    Globulin 3.6 2.0 - 4.0 g/dL    A-G Ratio 0.6 (L) 1.1 - 2.2       [unfilled]      Review of Systems    Unable  to obtain. Physical Exam:      Constitutional: pt is oriented to person, place, and time. HENT:   Head: Normocephalic and atraumatic. Eyes: Pupils are equal, round, and reactive to light. EOM are normal.   Cardiovascular: Normal rate, regular rhythm and normal heart sounds. Pulmonary/Chest: Breath sounds normal. No wheezes. No rales. Exhibits no tenderness. Abdominal: Soft. Bowel sounds are normal. There is no abdominal tenderness. There is no rebound and no guarding. Musculoskeletal: Normal range of motion. Neurological: pt is alert and oriented to person, place, and time. XR CHEST SNGL V   Final Result   FINDINGS: Impression: Frontal single view chest. Obscuring overlying cardiac   leads. Normal heart size. Calcified aorta. No vascular congestion. The lungs are somewhat hyperinflated, likely from air trapping. Unchanged small   opacity overlying the right fifth rib. Calcified biapical pleural-parenchymal   thickening unchanged. No consolidation, effusion, pneumothorax. Bony demineralization. Thoracic and lumbar vertebral body compressions grossly   similar to previous. Bilateral rib deformities similar to previous. No free air under the diaphragm.            Recent Results (from the past 24 hour(s))   C REACTIVE PROTEIN, QT    Collection Time: 12/05/20  6:58 PM   Result Value Ref Range    C-Reactive protein 0.98 (H) 0.00 - 0.60 mg/dL   LD    Collection Time: 12/05/20  6:58 PM Result Value Ref Range    LD 1,764 (H) 81 - 246 U/L   CBC WITH AUTOMATED DIFF    Collection Time: 12/05/20  6:58 PM   Result Value Ref Range    WBC 13.3 (H) 3.6 - 11.0 K/uL    RBC 4.83 3.80 - 5.20 M/uL    HGB 14.5 11.5 - 16.0 g/dL    HCT 44.1 35.0 - 47.0 %    MCV 91.3 80.0 - 99.0 FL    MCH 30.0 26.0 - 34.0 PG    MCHC 32.9 30.0 - 36.5 g/dL    RDW 14.0 11.5 - 14.5 %    PLATELET 726 619 - 660 K/uL    MPV 12.3 8.9 - 12.9 FL    NRBC 0.0 0  WBC    ABSOLUTE NRBC 0.00 0.00 - 0.01 K/uL    NEUTROPHILS 88 (H) 32 - 75 %    LYMPHOCYTES 7 (L) 12 - 49 %    MONOCYTES 5 5 - 13 %    EOSINOPHILS 0 0 - 7 %    BASOPHILS 0 0 - 1 %    IMMATURE GRANULOCYTES 4 (H) 0.0 - 0.5 %    ABS. NEUTROPHILS 11.7 (H) 1.8 - 8.0 K/UL    ABS. LYMPHOCYTES 1.0 0.8 - 3.5 K/UL    ABS. MONOCYTES 0.6 0.0 - 1.0 K/UL    ABS. EOSINOPHILS 0.0 0.0 - 0.4 K/UL    ABS. BASOPHILS 0.0 0.0 - 0.1 K/UL    ABS. IMM. GRANS. 0.5 (H) 0.00 - 0.04 K/UL    DF AUTOMATED     METABOLIC PANEL, COMPREHENSIVE    Collection Time: 12/05/20  6:58 PM   Result Value Ref Range    Sodium 139 136 - 145 mmol/L    Potassium 4.1 3.5 - 5.1 mmol/L    Chloride 100 97 - 108 mmol/L    CO2 33 (H) 21 - 32 mmol/L    Anion gap 6 5 - 15 mmol/L    Glucose 114 (H) 65 - 100 mg/dL    BUN 19 6 - 20 mg/dL    Creatinine 0.64 0.55 - 1.02 mg/dL    BUN/Creatinine ratio 30 (H) 12 - 20      GFR est AA >60 >60 ml/min/1.73m2    GFR est non-AA >60 >60 ml/min/1.73m2    Calcium 7.8 (L) 8.5 - 10.1 mg/dL    Bilirubin, total 2.9 (H) 0.2 - 1.0 mg/dL    AST (SGOT) >2,000 (H) 15 - 37 U/L    ALT (SGPT) 1,363 (H) 12 - 78 U/L    Alk.  phosphatase 314 (H) 45 - 117 U/L    Protein, total 5.9 (L) 6.4 - 8.2 g/dL    Albumin 2.3 (L) 3.5 - 5.0 g/dL    Globulin 3.6 2.0 - 4.0 g/dL    A-G Ratio 0.6 (L) 1.1 - 2.2         Results     Procedure Component Value Units Date/Time    INFLUENZA A & B AG (RAPID TEST) [[id number]] Collected:  11/30/20 1300    Order Status:  Completed Specimen:  Nasopharyngeal from Nasal washing Updated:  11/30/20 1320     Influenza A Antigen Negative        Influenza B Antigen Negative       CULTURE, URINE [[id number]] Collected:  11/30/20 1140    Order Status:  Completed Specimen:  Urine Updated:  12/03/20 0827     Special Requests: --        No Special Requests  Reflexed from [id number]       Culture result: No Growth (<1000 cfu/mL)       INFLUENZA A & B AG (RAPID TEST) [[id number]] Collected:  11/30/20 1045    Order Status:  Canceled Specimen:  Nasal washing     INFLUENZA A & B AG (RAPID TEST) [[id number]] Collected:  11/30/20 1000    Order Status:  Canceled Specimen:  Nasopharyngeal from Nasal washing     CULTURE, BLOOD, PAIRED [[id number]] Collected:  11/26/20 1640    Order Status:  Completed Specimen:  Blood Updated:  12/03/20 0749     Special Requests: No Special Requests        Culture result: No growth 6 days              Labs:     Recent Labs     12/05/20 1858 12/04/20  1347   WBC 13.3* 12.5*   HGB 14.5 13.5   HCT 44.1 41.8    315     Recent Labs     12/05/20 1858 12/04/20  1347    147*   K 4.1 4.1    110*   CO2 33* 30   BUN 19 29*   CREA 0.64 0.66   * 107*   CA 7.8* 8.4*     Recent Labs     12/05/20 1858 12/04/20  1347   ALT 1,363* 451*   * 158*   TBILI 2.9* 0.7   TP 5.9* 6.7   ALB 2.3* 2.7*   GLOB 3.6 4.0     No results for input(s): INR, PTP, APTT, INREXT, INREXT in the last 72 hours. Recent Labs     12/04/20  1347   FERR 2,619*      No results found for: FOL, RBCF   No results for input(s): PH, PCO2, PO2 in the last 72 hours. No results for input(s): CPK, CKNDX, TROIQ in the last 72 hours.     No lab exists for component: CPKMB  No results found for: CHOL, CHOLX, CHLST, CHOLV, HDL, HDLP, LDL, LDLC, DLDLP, TGLX, TRIGL, TRIGP, CHHD, CHHDX  No results found for: GLUCPOC  Lab Results   Component Value Date/Time    Color Yellow/Straw 11/30/2020 11:40 AM    Appearance Turbid (A) 11/30/2020 11:40 AM    Specific gravity 1.012 11/30/2020 11:40 AM    pH (UA) 5.0 11/30/2020 11:40 AM    Protein 100 (A) 11/30/2020 11:40 AM    Glucose Negative 11/30/2020 11:40 AM    Ketone 20 (A) 11/30/2020 11:40 AM    Bilirubin Negative 11/30/2020 11:40 AM    Urobilinogen 0.1 11/30/2020 11:40 AM    Nitrites Negative 11/30/2020 11:40 AM    Leukocyte Esterase Large (A) 11/30/2020 11:40 AM    Bacteria Negative 11/30/2020 11:40 AM    WBC >100 (H) 11/30/2020 11:40 AM    RBC 0-5 11/30/2020 11:40 AM         Assessment:     COVID-19 positive  Acute hypoxemic respiratory failure on 3 L oxygen nasal cannula  Advanced dementia  Hypernatremia  UTI  Hypokalemia  Acute kidney injury  Lactic acidosis  Elevated LFTs      Plan:   Pt had  remdesivir  Patient on IV Zithromax and Rocephin  On IV Decadron  Ordered convalescent plasma  IV fluid changed to D5 75 cc/h  Overall prognosis poor  Monitor sodium level and urine cultures   Today's labs pending    Ordered NG tube but unable to place  Seen by the GI for NG tube placement  Repeat the labs in the morning    Current Facility-Administered Medications:     heparin (porcine) injection 5,000 Units, 5,000 Units, SubCUTAneous, Q8H, Anand Zambrano MD, 5,000 Units at 12/06/20 0509    remdesivir 100 mg in 0.9% sodium chloride 250 mL IVPB, 100 mg, IntraVENous, Q24H, Yash Ramos MD, 100 mg at 12/05/20 1707    dorzolamide (TRUSOPT) 2 % ophthalmic solution 1 Drop, 1 Drop, Ophthalmic, BID, Anand Zambrano MD, 1 Drop at 12/06/20 0827    latanoprost (XALATAN) 0.005 % ophthalmic solution 1 Drop, 1 Drop, Both Eyes, QHS, Anand Zambrano MD, 1 Drop at 12/05/20 2200    azithromycin (ZITHROMAX) tablet 500 mg, 500 mg, Oral, See Admin Instructions, Esdras Zambrano MD    acetaminophen (TYLENOL) tablet 650 mg, 650 mg, Oral, Q6H PRN, 650 mg at 12/03/20 2125 **OR** acetaminophen (TYLENOL) suppository 650 mg, 650 mg, Rectal, Q6H PRN, Anand Zambrano MD    polyethylene glycol (MIRALAX) packet 17 g, 17 g, Oral, DAILY PRN, Anand Zambrano MD    ondansetron (ZOFRAN ODT) tablet 4 mg, 4 mg, Oral, Q8H PRN **OR** ondansetron (ZOFRAN) injection 4 mg, 4 mg, IntraVENous, Q6H PRN, Anand Zambrano MD    cefTRIAXone (ROCEPHIN) 1 g in 0.9% sodium chloride (MBP/ADV) 50 mL MBP, 1 g, IntraVENous, Q24H, Anand Zambrano MD, Last Rate: 100 mL/hr at 12/05/20 1708, 1 g at 12/05/20 1708    dexamethasone (DECADRON) 4 mg/mL injection 4 mg, 4 mg, IntraVENous, Q6H, nAand Zambrano MD, 4 mg at 12/06/20 0902    timolol (TIMOPTIC) 0.5 % ophthalmic solution 1 Drop, 1 Drop, Both Eyes, BID, Anand Zambrano MD, 1 Drop at 12/06/20 7028

## 2020-12-06 NOTE — PROGRESS NOTES
Progress Note    Patient: Teodora Calvo MRN: [id number]  SSN: [id number]    YOB: 1926  Age: 80 y.o.   Sex: female      Admit Date: 11/30/2020    LOS: 6 days     Subjective:   Confused but no stress,  RN again not able to pass ng tube    Past Medical History:   Diagnosis Date    Dementia ([address]     DNR (do not resuscitate)     Hypertension         Current Facility-Administered Medications:     tocilizumab 400 mg in 0.9% sodium chloride 100 mL infusion, 400 mg, IntraVENous, ONCE, Reji Beck MD    heparin (porcine) injection 5,000 Units, 5,000 Units, SubCUTAneous, Q8H, Anand Zambrano MD, 5,000 Units at 12/06/20 0509    remdesivir 100 mg in 0.9% sodium chloride 250 mL IVPB, 100 mg, IntraVENous, Q24H, Reji Beck MD, 100 mg at 12/05/20 1707    dorzolamide (TRUSOPT) 2 % ophthalmic solution 1 Drop, 1 Drop, Ophthalmic, BID, Anand Zambrano MD, 1 Drop at 12/06/20 0827    latanoprost (XALATAN) 0.005 % ophthalmic solution 1 Drop, 1 Drop, Both Eyes, QHS, Anand Zambrano MD, 1 Drop at 12/05/20 2200    azithromycin (ZITHROMAX) tablet 500 mg, 500 mg, Oral, See Admin Instructions, Mabel Zambrano MD  [address] [place] [address]  acetaminophen (TYLENOL) tablet 650 mg, 650 mg, Oral, Q6H PRN, 650 mg at 12/03/20 2125 **OR** acetaminophen (TYLENOL) suppository 650 mg, 650 mg, Rectal, Q6H PRN, Mabel Zambrano MD    polyethylene glycol (MIRALAX) packet 17 g, 17 g, Oral, DAILY PRN, Mabel Zambrano MD    ondansetron (ZOFRAN ODT) tablet 4 mg, 4 mg, Oral, Q8H PRN **OR** ondansetron (ZOFRAN) injection 4 mg, 4 mg, IntraVENous, Q6H PRN, Anand Zambrano MD    cefTRIAXone (ROCEPHIN) 1 g in 0.9% sodium chloride (MBP/ADV) 50 mL MBP, 1 g, IntraVENous, Q24H, Anand Zambrano MD, Last Rate: 100 mL/hr at 12/05/20 1708, 1 g at 12/05/20 1708    dexamethasone (DECADRON) 4 mg/mL injection 4 mg, 4 mg, IntraVENous, Q6H, Anand Zambrano MD, 4 mg at 12/06/20 0902    timolol (TIMOPTIC) 0.5 % ophthalmic solution 1 Drop, 1 Drop, Both Eyes, BID, Anand Zambrano MD, 1 Drop at 12/06/20 0827    Objective:     Vitals:    12/05/20 2052 12/06/20 0731 12/06/20 0826 12/06/20 1320   BP: (!) 146/85  110/73 128/78   Pulse: 60  84 88   Resp: 18  18 18   Temp: 98.7 °F (37.1 °C)  96.8 °F (36 °C) (!) 96.3 °F (35.7 °C)   SpO2: 98% 98% 98% 99%   Weight:       Height:            Intake and Output:  Current Shift: No intake/output data recorded. Last three shifts: No intake/output data recorded. Physical Exam:   Physical Exam   Constitutional: She appears distressed. HENT:   Head: Atraumatic. Eyes: Conjunctivae are normal.   Neck: No JVD present. No tracheal deviation present. Cardiovascular: Normal heart sounds. Pulmonary/Chest: Effort normal.   Abdominal: Soft. Bowel sounds are normal.   Musculoskeletal:         General: No edema. Neurological: She is alert. Lab/Data Review:  Recent Results (from the past 24 hour(s))   C REACTIVE PROTEIN, QT    Collection Time: 12/05/20  6:58 PM   Result Value Ref Range    C-Reactive protein 0.98 (H) 0.00 - 0.60 mg/dL   LD    Collection Time: 12/05/20  6:58 PM   Result Value Ref Range    LD 1,764 (H) 81 - 246 U/L   CBC WITH AUTOMATED DIFF    Collection Time: 12/05/20  6:58 PM   Result Value Ref Range    WBC 13.3 (H) 3.6 - 11.0 K/uL    RBC 4.83 3.80 - 5.20 M/uL    HGB 14.5 11.5 - 16.0 g/dL    HCT 44.1 35.0 - 47.0 %    MCV 91.3 80.0 - 99.0 FL    MCH 30.0 26.0 - 34.0 PG    MCHC 32.9 30.0 - 36.5 g/dL    RDW 14.0 11.5 - 14.5 %    PLATELET 611 069 - 558 K/uL    MPV 12.3 8.9 - 12.9 FL    NRBC 0.0 0  WBC    ABSOLUTE NRBC 0.00 0.00 - 0.01 K/uL    NEUTROPHILS 88 (H) 32 - 75 %    LYMPHOCYTES 7 (L) 12 - 49 %    MONOCYTES 5 5 - 13 %    EOSINOPHILS 0 0 - 7 %    BASOPHILS 0 0 - 1 %    IMMATURE GRANULOCYTES 4 (H) 0.0 - 0.5 %    ABS. NEUTROPHILS 11.7 (H) 1.8 - 8.0 K/UL    ABS. LYMPHOCYTES 1.0 0.8 - 3.5 K/UL    ABS. MONOCYTES 0.6 0.0 - 1.0 K/UL    ABS. EOSINOPHILS 0.0 0.0 - 0.4 K/UL    ABS.  BASOPHILS 0.0 0.0 - 0.1 K/UL    ABS. IMM. GRANS. 0.5 (H) 0.00 - 0.04 K/UL    DF AUTOMATED     METABOLIC PANEL, COMPREHENSIVE    Collection Time: 12/05/20  6:58 PM   Result Value Ref Range    Sodium 139 136 - 145 mmol/L    Potassium 4.1 3.5 - 5.1 mmol/L    Chloride 100 97 - 108 mmol/L    CO2 33 (H) 21 - 32 mmol/L    Anion gap 6 5 - 15 mmol/L    Glucose 114 (H) 65 - 100 mg/dL    BUN 19 6 - 20 mg/dL    Creatinine 0.64 0.55 - 1.02 mg/dL    BUN/Creatinine ratio 30 (H) 12 - 20      GFR est AA >60 >60 ml/min/1.73m2    GFR est non-AA >60 >60 ml/min/1.73m2    Calcium 7.8 (L) 8.5 - 10.1 mg/dL    Bilirubin, total 2.9 (H) 0.2 - 1.0 mg/dL    AST (SGOT) >2,000 (H) 15 - 37 U/L    ALT (SGPT) 1,363 (H) 12 - 78 U/L    Alk. phosphatase 314 (H) 45 - 117 U/L    Protein, total 5.9 (L) 6.4 - 8.2 g/dL    Albumin 2.3 (L) 3.5 - 5.0 g/dL    Globulin 3.6 2.0 - 4.0 g/dL    A-G Ratio 0.6 (L) 1.1 - 2.2          XR CHEST SNGL V   Final Result   FINDINGS: Impression: Frontal single view chest. Obscuring overlying cardiac   leads. Normal heart size. Calcified aorta. No vascular congestion. The lungs are somewhat hyperinflated, likely from air trapping. Unchanged small   opacity overlying the right fifth rib. Calcified biapical pleural-parenchymal   thickening unchanged. No consolidation, effusion, pneumothorax. Bony demineralization. Thoracic and lumbar vertebral body compressions grossly   similar to previous. Bilateral rib deformities similar to previous. No free air under the diaphragm.            Assessment:     Active Problems:    Acute respiratory failure ([address] (11/30/2020)      COVID-19 (11/30/2020)    COVID-19 positive  Acute hypoxemic respiratory failure    difficult feeding,     Plan:    iv fluids   Patient on IV Zithromax and Rocephin,  IV Decadron   Poor progress  Poor respiratory stature,advanced age   likely not able to sedate the pt for EGD guidance NG placement     Plan:       Signed By: Jelani Richardson MD     December 6, 2020 Thank you for allowing me to participate in this patients care  Cc Referring Physician   Krys Rivas MD

## 2020-12-06 NOTE — CONSULTS
Consult    Patient: Wayne Ferrer MRN: [id number]  SSN: [id number]    YOB: 1926  Age: 80 y.o. Sex: female      Subjective:      Wayne Ferrer is a 80 y.o. female who is being seen for difficulty eating,    the patient admitted to the hospitalist 4 days ago with shortness of breath, hypoxic, diagnosable covid 19  infection, had very poor po intaking, not keeping nutrition maintenance, need access for medication taking, NG tube is needed, however nurse had difficulty passing nG-tube, GI consultation asked to see pt for NG tube,    Past Medical History:   Diagnosis Date    Dementia ([address]     DNR (do not resuscitate)     Hypertension      No past surgical history on file. No family history on file.   Social History     Tobacco Use    Smoking status: Not on file   Substance Use Topics    Alcohol use: Not on file      Current Facility-Administered Medications   Medication Dose Route Frequency Provider Last Rate Last Dose    heparin (porcine) injection 5,000 Units  5,000 Units SubCUTAneous Q8H Anand Zambrano MD   5,000 Units at 12/05/20 1708    remdesivir 100 mg in 0.9% sodium chloride 250 mL IVPB  100 mg IntraVENous Q24H Mari Perkins MD   100 mg at 12/05/20 1707    dorzolamide (TRUSOPT) 2 % ophthalmic solution 1 Drop  1 Drop Ophthalmic BID Anand Zambrano MD   1 Drop at 12/05/20 3754    latanoprost (XALATAN) 0.005 % ophthalmic solution 1 Drop  1 Drop Both Eyes QHS Anand Zambrano MD   1 Drop at 12/04/20 2136    azithromycin (ZITHROMAX) tablet 500 mg  500 mg Oral See Admin Instructions Ab Javier MD       [address] [place] [address] acetaminophen (TYLENOL) tablet 650 mg  650 mg Oral Q6H PRN Anand Zambrano MD   650 mg at 12/03/20 2125    Or    acetaminophen (TYLENOL) suppository 650 mg  650 mg Rectal Q6H PRN Papo Zambrano MD        polyethylene glycol (MIRALAX) packet 17 g  17 g Oral DAILY PRN Papo Zambrano MD        ondansetron (ZOFRAN ODT) tablet 4 mg  4 mg Oral Q8H PRN Nicolas Lyon MD        Or    ondansetron [facility]) injection 4 mg  4 mg IntraVENous Q6H PRN Jose Zambrano MD        cefTRIAXone (ROCEPHIN) 1 g in 0.9% sodium chloride (MBP/ADV) 50 mL MBP  1 g IntraVENous Q24H Anand Zambrano  mL/hr at 12/05/20 1708 1 g at 12/05/20 1708    dexamethasone (DECADRON) 4 mg/mL injection 4 mg  4 mg IntraVENous Q6H Anand Zambrano MD   4 mg at 12/05/20 1708    timolol (TIMOPTIC) 0.5 % ophthalmic solution 1 Drop  1 Drop Both Eyes BID Anand Zambrano MD   1 Drop at 12/05/20 2524        No Known Allergies    Review of Systems:  Review of Systems   Unable to perform ROS: Mental acuity        Objective:     Vitals:    12/04/20 2151 12/05/20 0753 12/05/20 1121 12/05/20 1619   BP:  (!) 142/79 (!) 152/64 124/76   Pulse:  74 65 70   Resp:  18 18 18   Temp:  98.6 °F (37 °C) 98.7 °F (37.1 °C) (!) 96.4 °F (35.8 °C)   SpO2: 97% 93% 96% 96%   Weight:       Height:            Physical Exam:  Physical Exam   Constitutional: She appears lethargic. She appears cachectic. She is sleeping. She has a sickly appearance. She appears ill. She appears distressed. HENT:   Head: Atraumatic. Neck: No hepatojugular reflux present. No thyromegaly present. Cardiovascular: Normal pulses. An irregular rhythm present. Frequent extrasystoles are present. Pulmonary/Chest: Breath sounds normal. No accessory muscle usage. She is in respiratory distress. Abdominal: She exhibits no distension, no fluid wave, no ascites and no mass. Musculoskeletal:      Right shoulder: She exhibits no deformity and no laceration. Neurological: She appears lethargic. No results found for this or any previous visit (from the past 24 hour(s)). XR CHEST SNGL V   Final Result   FINDINGS: Impression: Frontal single view chest. Obscuring overlying cardiac   leads. Normal heart size. Calcified aorta. No vascular congestion. The lungs are somewhat hyperinflated, likely from air trapping.  Unchanged small   opacity overlying the right fifth rib. Calcified biapical pleural-parenchymal   thickening unchanged. No consolidation, effusion, pneumothorax. Bony demineralization. Thoracic and lumbar vertebral body compressions grossly   similar to previous. Bilateral rib deformities similar to previous. No free air under the diaphragm. Assessment:     Hospital Problems  Never Reviewed          Codes Class Noted POA    Acute respiratory failure ([facility] [address] ICD-10-CM: J96.00  ICD-9-CM: 518.81  11/30/2020 Unknown        COVID-19 ICD-10-CM: U07.1  ICD-9-CM: 079.89  11/30/2020 Unknown            COVID-19 positive  Acute hypoxemic respiratory failure   Lactic acidosis  Elevated LFTs   difficult feeding,     Plan:      Patient on IV Zithromax and Rocephin,  IV Decadron   convalescent plasma  IV fluids,   Poor progress  Poor respiratory stature,advanced age   likely not able to sedate the pt for any EGD guidance NG placement soon. Recommend PPN or TPN for  nutritious support for now,  Will reassess the patient after a couple days to see the patient respiratory status and general collection is  Better before any endoscopic procedure can be attempted.   Plan:       Signed By: Alyson Garcias MD     December 5, 2020         Thank you for allowing me to participate in this patients care  Cc Referring Physician   Susanne Arciniega MD

## 2020-12-06 NOTE — PROGRESS NOTES
Progress Note    Patient: Kleber Spence MRN: [id number]  SSN: [id number]    YOB: 1926  Age: 80 y.o. Sex: female      Admit Date: 11/30/2020    LOS: 6 days     Subjective:   Patient followed for sepsis with Covid-19  pneumonia with respiratory failure. Patient awake and responsive but confused. She is on nasal O2 at 3 L/min. CRP decreasing but LDH/Ferritin increasing. WBC elevated but on steroid. Objective:     Vitals:    12/05/20 1619 12/05/20 2052 12/06/20 0731 12/06/20 0826   BP: 124/76 (!) 146/85  110/73   Pulse: 70 60  84   Resp: 18 18  18   Temp: (!) 96.4 °F (35.8 °C) 98.7 °F (37.1 °C)  96.8 °F (36 °C)   SpO2: 96% 98% 98% 98%   Weight:       Height:            Intake and Output:  Current Shift: No intake/output data recorded. Last three shifts: No intake/output data recorded. Physical Exam:    Vitals signs and nursing note reviewed. Constitutional:       General: She is not in acute distress. Appearance: She is chronically ill-appearing. Comments: Cachectic    HENT: Nasal O2 3L/min    Pulmonary:  Clear but diminished BS   Abdominal:      Palpations: Abdomen is soft. Tenderness: There is no abdominal tenderness. There is no guarding. Genitourinary:     Comments: No Dowell catheter  Musculoskeletal:      Right lower leg: No edema. Left lower leg: No edema. Skin:     Findings: No rash. Neurological:      Mental Status: She is disoriented. Psychiatric:      Comments: Unable to assess because of dementia      Lab/Data Review:     WBC 13,300  LDH 1,764  <1,044  <318  <250  Ferritin 2,619 <405 <429    Procalciton <0.05  CRP 0.98 <1.76 <1.68 < 3.68    Blood cultures (11/26)  No growth FINAL  Urine culture (11/30)  No growth FINAL  Assessment:     Active Problems:    Acute respiratory failure ([facility] [address] (11/30/2020)      COVID-19 (11/30/2020)      1.  Covid-19 infection with possible pneumonitis, Day #5 IV Remdesivir, Azithromycin, Decadron, s/p convalescent plasma  2. Acute hypoxic respiratory failure, on nasal O2  3. No evidence of UTI with marked pyuria, culture negative  4. Sepsis with leukocytosis and elevated lactic acid and CRP  5. Elevated LDH, Ferritin and CRP, possibly secondary to #1  6. Dementia    Comment:  Leukocytosis likely secondary to steroids with decreasing CRP, however I am concerned about her increasing LDH and Ferritin. Doubt nosocomial infection. Plan:   1. Discontinue Remdesivir after today; continue Azithromycin, Ceftriaxone, Decadron  2. Administer first dose of Actemra  3.  In am, repeat CRP, LDH, ferritin, done        Signed By: Shital Teran MD     December 6, 2020

## 2020-12-07 NOTE — PROGRESS NOTES
Progress Note    Patient: Paul List MRN: [id number]  SSN: [id number]    YOB: 1926  Age: 80 y.o.   Sex: female      Admit Date: 11/30/2020    LOS: 7 days     Subjective:   Confused, shallow breath   RN again not able to pass ng tube    Past Medical History:   Diagnosis Date    Dementia ([address]     DNR (do not resuscitate)     Hypertension         Current Facility-Administered Medications:     0.9% sodium chloride infusion, 75 mL/hr, IntraVENous, CONTINUOUS, Anand Zambrano MD, Last Rate: 75 mL/hr at 12/07/20 1113, 75 mL/hr at 12/07/20 1113    dorzolamide (TRUSOPT) 2 % ophthalmic solution 1 Drop, 1 Drop, Ophthalmic, BID, Anand Zambrano MD, 1 Drop at 12/07/20 0838    latanoprost (XALATAN) 0.005 % ophthalmic solution 1 Drop, 1 Drop, Both Eyes, QHS, Anand Zambrano MD, 1 Drop at 12/06/20 2200    azithromycin (ZITHROMAX) tablet 500 mg, 500 mg, Oral, See Admin Instructions, Martina Zambrano MD    acetaminophen (TYLENOL) tablet 650 mg, 650 mg, Oral, Q6H PRN, 650 mg at 12/03/20 2125 **OR** acetaminophen (TYLENOL) suppository 650 mg, 650 mg, Rectal, Q6H PRN, Martina Zambrano MD    polyethylene glycol (MIRALAX) packet 17 g, 17 g, Oral, DAILY PRN, Martina Zambrano MD    ondansetron (ZOFRAN ODT) tablet 4 mg, 4 mg, Oral, Q8H PRN **OR** ondansetron (ZOFRAN) injection 4 mg, 4 mg, IntraVENous, Q6H PRN, Anand Zambrano MD    cefTRIAXone (ROCEPHIN) 1 g in 0.9% sodium chloride (MBP/ADV) 50 mL MBP, 1 g, IntraVENous, Q24H, Anand Zambrano MD, Last Rate: 100 mL/hr at 12/06/20 1603, 1 g at 12/06/20 1603    dexamethasone (DECADRON) 4 mg/mL injection 4 mg, 4 mg, IntraVENous, Q6H, Anand Zambrano MD, 4 mg at 12/07/20 1042    timolol (TIMOPTIC) 0.5 % ophthalmic solution 1 Drop, 1 Drop, Both Eyes, BID, Anand Zambrano MD, 1 Drop at 12/07/20 0838    Objective:     Vitals:    12/06/20 1943 12/06/20 2035 12/07/20 0740 12/07/20 1051   BP:  119/73 132/80    Pulse:  96 69    Resp:  18 18 Temp:  (!) 96.3 °F (35.7 °C) (!) 96.6 °F (35.9 °C)    SpO2: 98% 97% 100% 96%   Weight:       Height:            Intake and Output:  Current Shift: No intake/output data recorded. Last three shifts: No intake/output data recorded. Physical Exam:   Physical Exam   Constitutional: She appears lethargic. She appears distressed. HENT:   Head: Atraumatic. Eyes: Conjunctivae are normal.   Neck: No JVD present. No tracheal deviation present. Cardiovascular: Normal heart sounds. Pulmonary/Chest: Effort normal.   Abdominal: Soft. Bowel sounds are normal.   Musculoskeletal:         General: No edema. Neurological: She appears lethargic. Lab/Data Review:  Recent Results (from the past 24 hour(s))   CBC WITH AUTOMATED DIFF    Collection Time: 12/07/20  7:20 AM   Result Value Ref Range    WBC 32.2 (H) 3.6 - 11.0 K/uL    RBC 4.96 3.80 - 5.20 M/uL    HGB 15.0 11.5 - 16.0 g/dL    HCT 44.0 35.0 - 47.0 %    MCV 88.7 80.0 - 99.0 FL    MCH 30.2 26.0 - 34.0 PG    MCHC 34.1 30.0 - 36.5 g/dL    RDW 13.8 11.5 - 14.5 %    PLATELET 749 555 - 965 K/uL    MPV 12.5 8.9 - 12.9 FL    NEUTROPHILS 91 (H) 32 - 75 %    LYMPHOCYTES 4 (L) 12 - 49 %    MONOCYTES 5 5 - 13 %    EOSINOPHILS 0 0 - 7 %    BASOPHILS 0 0 - 1 %    IMMATURE GRANULOCYTES 0 %    ABS. NEUTROPHILS 29.3 (H) 1.8 - 8.0 K/UL    ABS. LYMPHOCYTES 1.3 0.8 - 3.5 K/UL    ABS. MONOCYTES 1.6 (H) 0.0 - 1.0 K/UL    ABS. EOSINOPHILS 0.0 0.0 - 0.4 K/UL    ABS. BASOPHILS 0.0 0.0 - 0.1 K/UL    ABS. IMM.  GRANS. 0.0 K/UL    DF Manual      RBC COMMENTS Schistocytes  1+        RBC COMMENTS Microcytosis  1+       METABOLIC PANEL, COMPREHENSIVE    Collection Time: 12/07/20  7:20 AM   Result Value Ref Range    Sodium 130 (L) 136 - 145 mmol/L    Potassium 3.8 3.5 - 5.1 mmol/L    Chloride 96 (L) 97 - 108 mmol/L    CO2 25 21 - 32 mmol/L    Anion gap 9 5 - 15 mmol/L    Glucose 179 (H) 65 - 100 mg/dL    BUN 33 (H) 6 - 20 mg/dL    Creatinine 0.74 0.55 - 1.02 mg/dL    BUN/Creatinine ratio 45 (H) 12 - 20      GFR est AA >60 >60 ml/min/1.73m2    GFR est non-AA >60 >60 ml/min/1.73m2    Calcium 7.5 (L) 8.5 - 10.1 mg/dL    Bilirubin, total 5.4 (H) 0.2 - 1.0 mg/dL    AST (SGOT) 411 (H) 15 - 37 U/L    ALT (SGPT) 704 (H) 12 - 78 U/L    Alk. phosphatase 292 (H) 45 - 117 U/L    Protein, total 5.2 (L) 6.4 - 8.2 g/dL    Albumin 2.0 (L) 3.5 - 5.0 g/dL    Globulin 3.2 2.0 - 4.0 g/dL    A-G Ratio 0.6 (L) 1.1 - 2.2     C REACTIVE PROTEIN, QT    Collection Time: 12/07/20  7:20 AM   Result Value Ref Range    C-Reactive protein 0.86 (H) 0.00 - 0.60 mg/dL   LD    Collection Time: 12/07/20  7:20 AM   Result Value Ref Range     (H) 81 - 246 U/L        XR CHEST SNGL V   Final Result   FINDINGS: Impression: Frontal single view chest. Obscuring overlying cardiac   leads. Normal heart size. Calcified aorta. No vascular congestion. The lungs are somewhat hyperinflated, likely from air trapping. Unchanged small   opacity overlying the right fifth rib. Calcified biapical pleural-parenchymal   thickening unchanged. No consolidation, effusion, pneumothorax. Bony demineralization. Thoracic and lumbar vertebral body compressions grossly   similar to previous. Bilateral rib deformities similar to previous. No free air under the diaphragm.       XR CHEST PORT    (Results Pending)        Assessment:     Active Problems:    Acute respiratory failure ([address] (11/30/2020)      COVID-19 (11/30/2020)    COVID-19 positive  Acute hypoxemic respiratory failure    difficult feeding,     Plan:     Patient on IV Zithromax and Rocephin,  IV Decadron   Poor progress  Poor respiratory stature,advanced age   likely not able to sedate the pt for EGD guidance NG placement     Plan:       Signed By: Kev Gilmore MD     December 7, 2020        Thank you for allowing me to participate in this patients care  Cc Referring Physician   Krys Rivas MD

## 2020-12-07 NOTE — ROUTINE PROCESS
Patient heart rate dropped in 30's and rapid response called; Pt is a DNR;   Pt became without respirations, apical and carotid pulses;  Pupils fixed; confirmed with Marian Johnson RN;  Dr. Eugene Hernández notified of death and pt's daughter Suzi Kohli

## 2020-12-07 NOTE — PROGRESS NOTES
Progress Note    Patient: Ann Marie Olmos MRN: [id number]  SSN: [id number]    YOB: 1926  Age: 80 y.o. Sex: female      Admit Date: 11/30/2020    LOS: 7 days     Subjective:   Patient followed for sepsis with Covid-19  pneumonia with respiratory failure. Patient awake and responsive but confused. She is on nasal O2 at 3 L/min. CRP and LDH decreasing. It appears that hospice is being considered by family. Objective:     Vitals:    12/06/20 1943 12/06/20 2035 12/07/20 0740 12/07/20 1051   BP:  119/73 132/80    Pulse:  96 69    Resp:  18 18    Temp:  (!) 96.3 °F (35.7 °C) (!) 96.6 °F (35.9 °C)    SpO2: 98% 97% 100% 96%   Weight:       Height:            Intake and Output:  Current Shift: No intake/output data recorded. Last three shifts: No intake/output data recorded. Physical Exam:    Vitals signs and nursing note reviewed. Constitutional:       General: She is not in acute distress. Appearance: She is chronically ill-appearing. Comments: Cachectic    HENT: Nasal O2 3L/min    Pulmonary:  Clear but diminished BS   Abdominal:      Palpations: Abdomen is soft. Tenderness: There is no abdominal tenderness. There is no guarding. Genitourinary:     Comments: No Dowell catheter  Musculoskeletal:      Right lower leg: No edema. Left lower leg: No edema. Skin:     Findings: No rash. Neurological:      Mental Status: She is disoriented. Psychiatric:      Comments: Unable to assess because of dementia      Lab/Data Review:     WBC 32,200   , 1,764  <1,044  <318  <250  Ferritin 2,619 <405 <429    Procalciton <0.05  CRP 0.86 <0.98 <1.76 <1.68 < 3.68    Blood cultures (11/26)  No growth FINAL  Urine culture (11/30)  No growth FINAL  Assessment:     Active Problems:    Acute respiratory failure ([address] (11/30/2020)      COVID-19 (11/30/2020)      1.  Covid-19 infection with possible pneumonitis, Day #5 IV Remdesivir, Azithromycin, Decadron, s/p convalescent plasma, status post Actemra x 1  2. Acute hypoxic respiratory failure, on nasal O2  3. No evidence of UTI with marked pyuria, culture negative  4. Sepsis with leukocytosis and elevated lactic acid and CRP  5. Elevated LDH, Ferritin and CRP, possibly secondary to #1  6. Dementia    Comment:  Leukocytosis likely secondary to steroids with decreasing CRP. LDH signficantly decreased today. Plan:   1. Consider stopping Azithromycin, Ceftriaxone  2. Possible placement on hospice. ADDENDUM:  Patient has .   Signed By: Britt Mesa MD     2020

## 2020-12-07 NOTE — PROGRESS NOTES
Bedside shift change report given to Diana Mena RN (oncoming nurse) by Arun Flor RN (offgoing nurse). Report included the following information SBAR, Intake/Output, MAR and Recent Results.

## 2020-12-07 NOTE — PROGRESS NOTES
General Daily Progress Note          Patient Name:   Shaheed Vicente       YOB: 1926       Age:  80 y.o. Admit Date: 11/30/2020      Subjective:       Patient not in any distress  Patient not eating drinking well  On 2 L oxygen by nasal cannula  Vomited /no witnessed  Dry vomitus secretion around the face and the hair  Unable to put the NG tube in           Objective:     Visit Vitals  /73 (BP Patient Position: At rest)   Pulse 96   Temp (!) 96.3 °F (35.7 °C)   Resp 18   Ht 5' 1\" (1.549 m)   Wt 45.4 kg (100 lb)   SpO2 97%   BMI 18.89 kg/m²        Recent Results (from the past 24 hour(s))   CBC WITH AUTOMATED DIFF    Collection Time: 12/07/20  7:20 AM   Result Value Ref Range    WBC 32.2 (H) 3.6 - 11.0 K/uL    RBC 4.96 3.80 - 5.20 M/uL    HGB 15.0 11.5 - 16.0 g/dL    HCT 44.0 35.0 - 47.0 %    MCV 88.7 80.0 - 99.0 FL    MCH 30.2 26.0 - 34.0 PG    MCHC 34.1 30.0 - 36.5 g/dL    RDW 13.8 11.5 - 14.5 %    PLATELET 957 339 - 046 K/uL    MPV 12.5 8.9 - 12.9 FL    NEUTROPHILS PENDING %    LYMPHOCYTES PENDING %    MONOCYTES PENDING %    EOSINOPHILS PENDING %    BASOPHILS PENDING %    IMMATURE GRANULOCYTES PENDING %    ABS. NEUTROPHILS PENDING K/UL    ABS. LYMPHOCYTES PENDING K/UL    ABS. MONOCYTES PENDING K/UL    ABS. EOSINOPHILS PENDING K/UL    ABS. BASOPHILS PENDING K/UL    ABS. IMM. GRANS.  PENDING K/UL    DF PENDING    METABOLIC PANEL, COMPREHENSIVE    Collection Time: 12/07/20  7:20 AM   Result Value Ref Range    Sodium 130 (L) 136 - 145 mmol/L    Potassium 3.8 3.5 - 5.1 mmol/L    Chloride 96 (L) 97 - 108 mmol/L    CO2 25 21 - 32 mmol/L    Anion gap 9 5 - 15 mmol/L    Glucose 179 (H) 65 - 100 mg/dL    BUN 33 (H) 6 - 20 mg/dL    Creatinine 0.74 0.55 - 1.02 mg/dL    BUN/Creatinine ratio 45 (H) 12 - 20      GFR est AA >60 >60 ml/min/1.73m2    GFR est non-AA >60 >60 ml/min/1.73m2    Calcium 7.5 (L) 8.5 - 10.1 mg/dL    Bilirubin, total 5.4 (H) 0.2 - 1.0 mg/dL    AST (SGOT) 411 (H) 15 - 37 U/L    ALT (SGPT) 704 (H) 12 - 78 U/L    Alk. phosphatase 292 (H) 45 - 117 U/L    Protein, total 5.2 (L) 6.4 - 8.2 g/dL    Albumin 2.0 (L) 3.5 - 5.0 g/dL    Globulin 3.2 2.0 - 4.0 g/dL    A-G Ratio 0.6 (L) 1.1 - 2.2     C REACTIVE PROTEIN, QT    Collection Time: 12/07/20  7:20 AM   Result Value Ref Range    C-Reactive protein 0.86 (H) 0.00 - 0.60 mg/dL   LD    Collection Time: 12/07/20  7:20 AM   Result Value Ref Range     (H) 81 - 246 U/L     [unfilled]      Review of Systems    Unable  to obtain. Physical Exam:      Constitutional: Not responding well   HENT:   Head: Normocephalic and atraumatic. Eyes: Pupils are equal, round, and reactive to light. EOM are normal.   Cardiovascular: Normal rate, regular rhythm and normal heart sounds. Pulmonary/Chest: Breath sounds normal. No wheezes. No rales. Exhibits no tenderness. Abdominal: Soft. Bowel sounds are normal. There is no abdominal tenderness. There is no rebound and no guarding. Musculoskeletal: Normal range of motion. Neurological:not  Responding well  XR CHEST SNGL V   Final Result   FINDINGS: Impression: Frontal single view chest. Obscuring overlying cardiac   leads. Normal heart size. Calcified aorta. No vascular congestion. The lungs are somewhat hyperinflated, likely from air trapping. Unchanged small   opacity overlying the right fifth rib. Calcified biapical pleural-parenchymal   thickening unchanged. No consolidation, effusion, pneumothorax. Bony demineralization. Thoracic and lumbar vertebral body compressions grossly   similar to previous. Bilateral rib deformities similar to previous. No free air under the diaphragm.            Recent Results (from the past 24 hour(s))   CBC WITH AUTOMATED DIFF    Collection Time: 12/07/20  7:20 AM   Result Value Ref Range    WBC 32.2 (H) 3.6 - 11.0 K/uL    RBC 4.96 3.80 - 5.20 M/uL    HGB 15.0 11.5 - 16.0 g/dL    HCT 44.0 35.0 - 47.0 %    MCV 88.7 80.0 - 99.0 FL    MCH 30.2 26.0 - 34.0 PG    MCHC 34.1 30.0 - 36.5 g/dL    RDW 13.8 11.5 - 14.5 %    PLATELET 501 076 - 574 K/uL    MPV 12.5 8.9 - 12.9 FL    NEUTROPHILS PENDING %    LYMPHOCYTES PENDING %    MONOCYTES PENDING %    EOSINOPHILS PENDING %    BASOPHILS PENDING %    IMMATURE GRANULOCYTES PENDING %    ABS. NEUTROPHILS PENDING K/UL    ABS. LYMPHOCYTES PENDING K/UL    ABS. MONOCYTES PENDING K/UL    ABS. EOSINOPHILS PENDING K/UL    ABS. BASOPHILS PENDING K/UL    ABS. IMM. GRANS. PENDING K/UL    DF PENDING    METABOLIC PANEL, COMPREHENSIVE    Collection Time: 12/07/20  7:20 AM   Result Value Ref Range    Sodium 130 (L) 136 - 145 mmol/L    Potassium 3.8 3.5 - 5.1 mmol/L    Chloride 96 (L) 97 - 108 mmol/L    CO2 25 21 - 32 mmol/L    Anion gap 9 5 - 15 mmol/L    Glucose 179 (H) 65 - 100 mg/dL    BUN 33 (H) 6 - 20 mg/dL    Creatinine 0.74 0.55 - 1.02 mg/dL    BUN/Creatinine ratio 45 (H) 12 - 20      GFR est AA >60 >60 ml/min/1.73m2    GFR est non-AA >60 >60 ml/min/1.73m2    Calcium 7.5 (L) 8.5 - 10.1 mg/dL    Bilirubin, total 5.4 (H) 0.2 - 1.0 mg/dL    AST (SGOT) 411 (H) 15 - 37 U/L    ALT (SGPT) 704 (H) 12 - 78 U/L    Alk.  phosphatase 292 (H) 45 - 117 U/L    Protein, total 5.2 (L) 6.4 - 8.2 g/dL    Albumin 2.0 (L) 3.5 - 5.0 g/dL    Globulin 3.2 2.0 - 4.0 g/dL    A-G Ratio 0.6 (L) 1.1 - 2.2     C REACTIVE PROTEIN, QT    Collection Time: 12/07/20  7:20 AM   Result Value Ref Range    C-Reactive protein 0.86 (H) 0.00 - 0.60 mg/dL   LD    Collection Time: 12/07/20  7:20 AM   Result Value Ref Range     (H) 81 - 246 U/L       Results     Procedure Component Value Units Date/Time    INFLUENZA A & B AG (RAPID TEST) [[id number]] Collected:  11/30/20 1300    Order Status:  Completed Specimen:  Nasopharyngeal from Nasal washing Updated:  11/30/20 1320     Influenza A Antigen Negative        Influenza B Antigen Negative       CULTURE, URINE [[id number]] Collected:  11/30/20 1140    Order Status:  Completed Specimen:  Urine Updated:  12/03/20 0801 Special Requests: --        No Special Requests  Reflexed from [id number]       Culture result: No Growth (<1000 cfu/mL)       INFLUENZA A & B AG (RAPID TEST) [[id number]] Collected:  11/30/20 1045    Order Status:  Canceled Specimen:  Nasal washing     INFLUENZA A & B AG (RAPID TEST) [[id number]] Collected:  11/30/20 1000    Order Status:  Canceled Specimen:  Nasopharyngeal from Nasal washing     CULTURE, BLOOD, PAIRED [[id number]] Collected:  11/26/20 1640    Order Status:  Completed Specimen:  Blood Updated:  12/03/20 0749     Special Requests: No Special Requests        Culture result: No growth 6 days              Labs:     Recent Labs     12/07/20 0720 12/05/20 1858   WBC 32.2* 13.3*   HGB 15.0 14.5   HCT 44.0 44.1    293     Recent Labs     12/07/20 0720 12/05/20 1858 12/04/20  1347   * 139 147*   K 3.8 4.1 4.1   CL 96* 100 110*   CO2 25 33* 30   BUN 33* 19 29*   CREA 0.74 0.64 0.66   * 114* 107*   CA 7.5* 7.8* 8.4*     Recent Labs     12/07/20 0720 12/05/20 1858 12/04/20  1347   * 1,363* 451*   * 314* 158*   TBILI 5.4* 2.9* 0.7   TP 5.2* 5.9* 6.7   ALB 2.0* 2.3* 2.7*   GLOB 3.2 3.6 4.0     No results for input(s): INR, PTP, APTT, INREXT, INREXT in the last 72 hours. Recent Labs     12/04/20  1347   FERR 2,619*      No results found for: FOL, RBCF   No results for input(s): PH, PCO2, PO2 in the last 72 hours. No results for input(s): CPK, CKNDX, TROIQ in the last 72 hours.     No lab exists for component: CPKMB  No results found for: CHOL, CHOLX, CHLST, CHOLV, HDL, HDLP, LDL, LDLC, DLDLP, TGLX, TRIGL, TRIGP, CHHD, CHHDX  No results found for: GLUCPOC  Lab Results   Component Value Date/Time    Color Yellow/Straw 11/30/2020 11:40 AM    Appearance Turbid (A) 11/30/2020 11:40 AM    Specific gravity 1.012 11/30/2020 11:40 AM    pH (UA) 5.0 11/30/2020 11:40 AM    Protein 100 (A) 11/30/2020 11:40 AM    Glucose Negative 11/30/2020 11:40 AM    Ketone 20 (A) 11/30/2020 11:40 AM Bilirubin Negative 11/30/2020 11:40 AM    Urobilinogen 0.1 11/30/2020 11:40 AM    Nitrites Negative 11/30/2020 11:40 AM    Leukocyte Esterase Large (A) 11/30/2020 11:40 AM    Bacteria Negative 11/30/2020 11:40 AM    WBC >100 (H) 11/30/2020 11:40 AM    RBC 0-5 11/30/2020 11:40 AM         Assessment:     COVID-19 positive  Acute hypoxemic respiratory failure on 2 L oxygen nasal cannula  Aspiration from vomitus  Advanced dementia  Acute metabolic encephalopathy  Hyponatremia  Leukocytosis  UTI  Hypokalemia  Acute kidney injury  Lactic acidosis  Elevated LFTs  Leukocytosis secondary to steroids      Plan:   Pt had  remdesivir  Patient on IV Zithromax and Rocephin  On IV Decadron  Ordered convalescent plasma  IV fluid changed to D5 75 cc/h  Overall prognosis poor  Monitor sodium level and urine cultures   Today's labs pending  Chest x-ray  Ordered NG tube but unable to place  Seen by the GI unable to put the NG tube in  Repeat the labs in the morning  Overall prognosis very poor      Discussed with POA/about the patient condition she want patient to be discharged back to nursing home with hospice  Discussed with the     Current Facility-Administered Medications:     heparin (porcine) injection 5,000 Units, 5,000 Units, SubCUTAneous, Q8H, Anand Zambrano MD, 5,000 Units at 12/07/20 0517    dorzolamide (TRUSOPT) 2 % ophthalmic solution 1 Drop, 1 Drop, Ophthalmic, BID, Anand Zambrano MD, 1 Drop at 12/07/20 0838    latanoprost (XALATAN) 0.005 % ophthalmic solution 1 Drop, 1 Drop, Both Eyes, QHS, Anand Zambrano MD, 1 Drop at 12/06/20 2200    azithromycin (ZITHROMAX) tablet 500 mg, 500 mg, Oral, See Admin Instructions, Esdras Zambrano MD    acetaminophen (TYLENOL) tablet 650 mg, 650 mg, Oral, Q6H PRN, 650 mg at 12/03/20 2125 **OR** acetaminophen (TYLENOL) suppository 650 mg, 650 mg, Rectal, Q6H PRN, Anand Zambrano MD    polyethylene glycol (MIRALAX) packet 17 g, 17 g, Oral, DAILY PRN, Juan Manuel, Eun Rajan MD    ondansetron [facility] ODT) tablet 4 mg, 4 mg, Oral, Q8H PRN **OR** ondansetron (ZOFRAN) injection 4 mg, 4 mg, IntraVENous, Q6H PRN, Eun Zambrano MD    cefTRIAXone (ROCEPHIN) 1 g in 0.9% sodium chloride (MBP/ADV) 50 mL MBP, 1 g, IntraVENous, Q24H, Anand Zambrano MD, Last Rate: 100 mL/hr at 12/06/20 1603, 1 g at 12/06/20 1603    dexamethasone (DECADRON) 4 mg/mL injection 4 mg, 4 mg, IntraVENous, Q6H, Anand Zambrano MD, 4 mg at 12/07/20 3384    timolol (TIMOPTIC) 0.5 % ophthalmic solution 1 Drop, 1 Drop, Both Eyes, BID, Anand Zambrano MD, 1 Drop at 12/07/20 3940

## 2020-12-07 NOTE — PROGRESS NOTES
Chart reviewed. Patient still requiring inpatient treatment. Patient will discharge to [address] at discharge. Will continue to follow for discharge needs.

## 2020-12-07 NOTE — PROGRESS NOTES
The purpose of the visit was in response to a Rapid Respond code, where the patient had DNR entered and it became a death. The patient had no family present at the bedside at the time of the death. The family had not yet been contacted. The  provided the ministry of presence and the comfort of care on the unit. [address] [facility] [address]aden Wetzel.    can be reached by calling the  at [facility]  [phone]

## 2020-12-07 NOTE — PROGRESS NOTES
----- Message from Rae Zacarias sent at 4/3/2017  3:13 PM CDT -----  Contact: Emily Diaz with [facility] [phone] is requesting an order for physical therapy/patient is saying the order is for [facility] /please enter order in the computer   Bedside shift report was given to Christopher Babcock.

## 2020-12-07 NOTE — PROGRESS NOTES
HEBER received a call from Dr. Zita Schumacher. He states that he spoke with [facility] and they are agreeable to Hospice. Heber will call [facility] for choice and [facility] to see if she can return. Will continue to monitor for discharge needs.

## 2020-12-07 NOTE — PROGRESS NOTES
Problem: Pressure Injury - Risk of  Goal: *Prevention of pressure injury  Description: Document Marcello Scale and appropriate interventions in the flowsheet.   Outcome: Progressing Towards Goal  Note: Pressure Injury Interventions:  Sensory Interventions: Minimize linen layers    Moisture Interventions: Minimize layers, Maintain skin hydration (lotion/cream)    Activity Interventions: PT/OT evaluation    Mobility Interventions: PT/OT evaluation, Float heels    Nutrition Interventions: Document food/fluid/supplement intake    Friction and Shear Interventions: Minimize layers

## 2020-12-08 LAB — FERRITIN SERPL-MCNC: ABNORMAL NG/ML (ref 8–252)

## 2020-12-15 NOTE — DISCHARGE SUMMARY
Discharge Summary PATIENT ID: Gwlee annda Aftab MRN: [id number] YOB: 1926 DATE OF ADMISSION: 11/30/2020  9:43 AM   
DATE OF DISCHARGE:  
PRIMARY CARE PROVIDER: Peyton Pavon MD  
 
ATTENDING PHYSICIAN: Braxton Beard DISCHARGING PROVIDER: Braxton Beard CONSULTATIONS: IP CONSULT TO NEPHROLOGY PROCEDURES/SURGERIES: * No surgery found * ADMITTING DIAGNOSES:   
Patient Active Problem List  
 Diagnosis Date Noted  Acute respiratory failure ([address] 11/30/2020  COVID-19 11/30/2020 DISCHARGE DIAGNOSES / PLAN:   
COVID-19 positive Acute hypoxemic respiratory failure on 2 L oxygen nasal cannula Aspiration from vomitus Advanced dementia Acute metabolic encephalopathy Hyponatremia Leukocytosis UTI Hypokalemia Acute kidney injury Lactic acidosis Elevated LFTs Leukocytosis secondary to steroids DISCHARGE MEDICATIONS: 
Discharge Medication List as of 12/7/2020  7:20 PM  
  
 
 
 
 
 
DISPOSITION: 
x  Home With: 
 OT  PT  [facility]  RN  
  
 Long term SNF/Inpatient Rehab Independent/assisted living Hospice Other: PHYSICAL EXAMINATION AT DISCHARGE: 
General:          Alert, cooperative, no distress, appears stated age. HEENT:           Atraumatic, anicteric sclerae, pink conjunctivae No oral ulcers, mucosa moist, throat clear, dentition fair Neck:               Supple, symmetrical 
Lungs:             Clear to auscultation bilaterally. No Wheezing or Rhonchi. No rales. Chest wall:      No tenderness  No Accessory muscle use. Heart:              Regular  rhythm,  No  murmur   No edema Abdomen:        Soft, non-tender. Not distended. Bowel sounds normal 
Extremities:     No cyanosis. No clubbing,   
                        Skin turgor normal, Capillary refill normal 
Skin:                Not pale. Not Jaundiced  No rashes Psych:             Not anxious or agitated.  
Neurologic:      Alert, moves all extremities, answers questions appropriately and responds to commands XR CHEST SNGL V Final Result FINDINGS: Impression: Frontal single view chest. Obscuring overlying cardiac  
leads. Normal heart size. Calcified aorta. No vascular congestion. The lungs are somewhat hyperinflated, likely from air trapping. Unchanged small  
opacity overlying the right fifth rib. Calcified biapical pleural-parenchymal  
thickening unchanged. No consolidation, effusion, pneumothorax. Bony demineralization. Thoracic and lumbar vertebral body compressions grossly  
similar to previous. Bilateral rib deformities similar to previous. No free air under the diaphragm. No results found for this or any previous visit (from the past 24 hour(s)). HOSPITAL COURSE: 
Patient is a 80y.o. year old female with history of dementia patient from [address] [place] [address] home with shortness of breath patient was recently positive for Covid 19 this morning patient having difficulty in breathing and hypoxemic on room air sent to the ER further evaluation and treatment 
  
Work-up shows elevated sodium and UA positive for UTI As the patient was COVID-19 positive acute hypoxic respiratory failure with aspiration advanced dementia patient had remdesivir Zithromax and Rocephin IV Decadron and also convalescent plasma while in the hospital overall prognosis was very poor discussed with POA and recommend the patient can be discharged back to nursing home with hospice no further work-up and treatment Signed: Clarissa Cleary MD 
12/15/2020 
4:40 PM